# Patient Record
Sex: FEMALE | Race: WHITE | HISPANIC OR LATINO | Employment: UNEMPLOYED | URBAN - METROPOLITAN AREA
[De-identification: names, ages, dates, MRNs, and addresses within clinical notes are randomized per-mention and may not be internally consistent; named-entity substitution may affect disease eponyms.]

---

## 2023-04-28 PROBLEM — E66.812 CLASS 2 OBESITY DUE TO EXCESS CALORIES WITHOUT SERIOUS COMORBIDITY WITH BODY MASS INDEX (BMI) OF 36.0 TO 36.9 IN ADULT: Status: ACTIVE | Noted: 2023-04-28

## 2024-04-10 ENCOUNTER — TELEPHONE (OUTPATIENT)
Dept: OBSTETRICS AND GYNECOLOGY | Facility: CLINIC | Age: 29
End: 2024-04-10
Payer: MEDICAID

## 2024-04-10 DIAGNOSIS — E28.2 PCOS (POLYCYSTIC OVARIAN SYNDROME): Primary | ICD-10-CM

## 2024-04-10 DIAGNOSIS — N92.6 IRREGULAR MENSTRUAL BLEEDING: ICD-10-CM

## 2024-04-10 NOTE — TELEPHONE ENCOUNTER
----- Message from Dominique Garcia MA sent at 4/9/2024  1:11 PM EDT -----  Regarding: FW: Finish the pills   Contact: [phone]  Pt scheduled for labs for 4/22 if you would just place orders please and thank you  ----- Message -----  From: Nkechi Hamilton  Sent: 4/9/2024   1:05 PM EDT  To: [facility] Clinical [facility]  Subject: Finish the pills                                 Getachew thank you

## 2024-05-06 ENCOUNTER — TELEPHONE (OUTPATIENT)
Dept: OBSTETRICS AND GYNECOLOGY | Facility: CLINIC | Age: 29
End: 2024-05-06
Payer: MEDICAID

## 2024-05-29 ENCOUNTER — OFFICE VISIT (OUTPATIENT)
Dept: FAMILY MEDICINE CLINIC | Facility: CLINIC | Age: 29
End: 2024-05-29
Payer: MEDICAID

## 2024-05-29 VITALS
OXYGEN SATURATION: 97 % | WEIGHT: 199.8 LBS | RESPIRATION RATE: 18 BRPM | HEART RATE: 84 BPM | TEMPERATURE: 98 F | SYSTOLIC BLOOD PRESSURE: 120 MMHG | DIASTOLIC BLOOD PRESSURE: 80 MMHG | HEIGHT: 63 IN | BODY MASS INDEX: 35.4 KG/M2

## 2024-05-29 DIAGNOSIS — M54.42 ACUTE BILATERAL LOW BACK PAIN WITH BILATERAL SCIATICA: Primary | ICD-10-CM

## 2024-05-29 DIAGNOSIS — M54.41 ACUTE BILATERAL LOW BACK PAIN WITH BILATERAL SCIATICA: Primary | ICD-10-CM

## 2024-05-29 DIAGNOSIS — R20.2 PARESTHESIA OF LEFT UPPER LIMB: ICD-10-CM

## 2024-05-29 PROCEDURE — 1159F MED LIST DOCD IN RCRD: CPT

## 2024-05-29 PROCEDURE — 1160F RVW MEDS BY RX/DR IN RCRD: CPT

## 2024-05-29 PROCEDURE — 99214 OFFICE O/P EST MOD 30 MIN: CPT

## 2024-05-29 PROCEDURE — 1125F AMNT PAIN NOTED PAIN PRSNT: CPT

## 2024-05-29 RX ORDER — NAPROXEN 500 MG/1
500 TABLET ORAL 2 TIMES DAILY PRN
Qty: 30 TABLET | Refills: 0 | Status: SHIPPED | OUTPATIENT
Start: 2024-05-29

## 2024-05-29 RX ORDER — TRIAMCINOLONE ACETONIDE 1 MG/G
1 CREAM TOPICAL DAILY
COMMUNITY
Start: 2024-04-28

## 2024-05-29 NOTE — PROGRESS NOTES
"Chief Complaint  Back Pain (Injury on 4/25)    Subjective        Nkechi Saavedra presents to [facility]  History of Present Illness  Patient is a 28 y.o. female who presents to the office today for back pain. She is new to me but a patient of MIAH Wakefield.  She was last seen by her on 7/31/2023.  She states that she had a back injury on April 25 when she was carrying a heavy object.  She states she bent down and when she stood back up she had a sharp pain in her lower back.  She states she was seen at [facility] 2 weeks after and was diagnosed with acute bilateral low back pain with left-sided sciatica.  She states she had x-rays done at the time.  Records are not available, they have been requested.  She states her pain is improving but has not resolved.  She continues to take cyclobenzaprine as needed.  She complains of left and right leg tingling.  She denies incontinence of bowel or bladder.  She also complains of a pins and needle sensation down her left arm with numbness of her fingers.         Objective   Vital Signs:  /80 (BP Location: Left arm, Patient Position: Sitting, Cuff Size: Adult)   Pulse 84   Temp 98 °F (36.7 °C) (Temporal)   Resp 18   Ht 160 cm (62.99\")   Wt 90.6 kg (199 lb 12.8 oz)   SpO2 97%   BMI 35.40 kg/m²   Estimated body mass index is 35.4 kg/m² as calculated from the following:    Height as of this encounter: 160 cm (62.99\").    Weight as of this encounter: 90.6 kg (199 lb 12.8 oz).               Physical Exam  Constitutional:       Appearance: Normal appearance.   Cardiovascular:      Rate and Rhythm: Normal rate and regular rhythm.      Heart sounds: Normal heart sounds.   Pulmonary:      Effort: Pulmonary effort is normal.      Breath sounds: Normal breath sounds.   Musculoskeletal:      Cervical back: Normal.      Lumbar back: Tenderness present.   Neurological:      Mental Status: She is alert. "   Psychiatric:         Mood and Affect: Mood normal.        Result Review :    The following data was reviewed by: MIAH Dumont on 05/29/2024:  Common labs          7/8/2023    19:09 7/9/2023    11:25 7/11/2023    04:00   Common Labs   Calcium   9.8       Alkaline Phosphatase   56       WBC 8.86         Hemoglobin 12.9         Hematocrit 40.6         Platelets 363         Hemoglobin A1C  5.5           Details          This result is from an external source.                          Assessment and Plan     Diagnoses and all orders for this visit:    1. Acute bilateral low back pain with bilateral sciatica (Primary)  -     naproxen (NAPROSYN) 500 MG tablet; Take 1 tablet by mouth 2 (Two) Times a Day As Needed for Mild Pain.  Dispense: 30 tablet; Refill: 0  -     Ambulatory Referral to Physical Therapy    2. Paresthesia of left upper limb  -     XR Spine Cervical 2 or 3 View; Future    Patient agrees with plan of care and understands instructions. Call if worsening symptoms or any problems or concerns.            Follow Up     Return if symptoms worsen or fail to improve.  Patient was given instructions and counseling regarding her condition or for health maintenance advice. Please see specific information pulled into the AVS if appropriate.

## 2024-05-30 ENCOUNTER — PATIENT MESSAGE (OUTPATIENT)
Dept: OBSTETRICS AND GYNECOLOGY | Facility: CLINIC | Age: 29
End: 2024-05-30
Payer: MEDICAID

## 2024-05-30 NOTE — TELEPHONE ENCOUNTER
HCG quant and progesterone done 4/22/24. States she went a few days ago to do blood test, but not seeing results of progesterone or HCG. I did not see a new order. Thank you

## 2024-05-31 ENCOUNTER — TELEPHONE (OUTPATIENT)
Dept: OBSTETRICS AND GYNECOLOGY | Facility: CLINIC | Age: 29
End: 2024-05-31
Payer: MEDICAID

## 2024-05-31 NOTE — TELEPHONE ENCOUNTER
OK. Please let pt know that labs were not done.  Due to labs having to be done in a very specific day of the cycle, we cannot repeat them now.  She needs to contact us with the start of her next menstrual cycle, or if she does not have a menstrual cycle after another 2 weeks

## 2024-07-31 ENCOUNTER — OFFICE VISIT (OUTPATIENT)
Dept: FAMILY MEDICINE CLINIC | Facility: CLINIC | Age: 29
End: 2024-07-31
Payer: MEDICAID

## 2024-07-31 VITALS
OXYGEN SATURATION: 97 % | SYSTOLIC BLOOD PRESSURE: 130 MMHG | HEIGHT: 63 IN | DIASTOLIC BLOOD PRESSURE: 90 MMHG | WEIGHT: 204 LBS | TEMPERATURE: 98 F | HEART RATE: 84 BPM | BODY MASS INDEX: 36.14 KG/M2

## 2024-07-31 DIAGNOSIS — R73.09 ELEVATED GLUCOSE LEVEL: ICD-10-CM

## 2024-07-31 DIAGNOSIS — H57.11 EYE DISCOMFORT, RIGHT: Primary | ICD-10-CM

## 2024-07-31 DIAGNOSIS — E55.9 VITAMIN D DEFICIENCY: ICD-10-CM

## 2024-07-31 DIAGNOSIS — E66.09 CLASS 2 OBESITY DUE TO EXCESS CALORIES WITHOUT SERIOUS COMORBIDITY WITH BODY MASS INDEX (BMI) OF 36.0 TO 36.9 IN ADULT: ICD-10-CM

## 2024-07-31 DIAGNOSIS — E78.2 MIXED HYPERLIPIDEMIA: ICD-10-CM

## 2024-07-31 PROBLEM — E04.1 THYROID NODULE: Status: RESOLVED | Noted: 2023-02-22 | Resolved: 2024-07-31

## 2024-07-31 LAB
25(OH)D3+25(OH)D2 SERPL-MCNC: 23.9 NG/ML (ref 30–100)
ALBUMIN SERPL-MCNC: 4.6 G/DL (ref 3.5–5.2)
ALBUMIN/GLOB SERPL: 1.8 G/DL
ALP SERPL-CCNC: 66 U/L (ref 39–117)
ALT SERPL-CCNC: 39 U/L (ref 1–33)
AST SERPL-CCNC: 21 U/L (ref 1–32)
BASOPHILS # BLD AUTO: 0.05 10*3/MM3 (ref 0–0.2)
BASOPHILS NFR BLD AUTO: 0.5 % (ref 0–1.5)
BILIRUB SERPL-MCNC: 0.2 MG/DL (ref 0–1.2)
BUN SERPL-MCNC: 12 MG/DL (ref 6–20)
BUN/CREAT SERPL: 16.2 (ref 7–25)
CALCIUM SERPL-MCNC: 9.4 MG/DL (ref 8.6–10.5)
CHLORIDE SERPL-SCNC: 104 MMOL/L (ref 98–107)
CHOLEST SERPL-MCNC: 220 MG/DL (ref 0–200)
CO2 SERPL-SCNC: 24.9 MMOL/L (ref 22–29)
CREAT SERPL-MCNC: 0.74 MG/DL (ref 0.57–1)
EGFRCR SERPLBLD CKD-EPI 2021: 114.6 ML/MIN/1.73
EOSINOPHIL # BLD AUTO: 0.56 10*3/MM3 (ref 0–0.4)
EOSINOPHIL NFR BLD AUTO: 5.1 % (ref 0.3–6.2)
ERYTHROCYTE [DISTWIDTH] IN BLOOD BY AUTOMATED COUNT: 13 % (ref 12.3–15.4)
GLOBULIN SER CALC-MCNC: 2.6 GM/DL
GLUCOSE SERPL-MCNC: 100 MG/DL (ref 65–99)
HBA1C MFR BLD: 5.4 % (ref 4.8–5.6)
HCT VFR BLD AUTO: 42.2 % (ref 34–46.6)
HDLC SERPL-MCNC: 57 MG/DL (ref 40–60)
HGB BLD-MCNC: 13.3 G/DL (ref 12–15.9)
IMM GRANULOCYTES # BLD AUTO: 0.07 10*3/MM3 (ref 0–0.05)
IMM GRANULOCYTES NFR BLD AUTO: 0.6 % (ref 0–0.5)
LDLC SERPL CALC-MCNC: 141 MG/DL (ref 0–100)
LYMPHOCYTES # BLD AUTO: 3.85 10*3/MM3 (ref 0.7–3.1)
LYMPHOCYTES NFR BLD AUTO: 34.9 % (ref 19.6–45.3)
MCH RBC QN AUTO: 25.4 PG (ref 26.6–33)
MCHC RBC AUTO-ENTMCNC: 31.5 G/DL (ref 31.5–35.7)
MCV RBC AUTO: 80.5 FL (ref 79–97)
MONOCYTES # BLD AUTO: 0.67 10*3/MM3 (ref 0.1–0.9)
MONOCYTES NFR BLD AUTO: 6.1 % (ref 5–12)
NEUTROPHILS # BLD AUTO: 5.82 10*3/MM3 (ref 1.7–7)
NEUTROPHILS NFR BLD AUTO: 52.8 % (ref 42.7–76)
NRBC BLD AUTO-RTO: 0 /100 WBC (ref 0–0.2)
PLATELET # BLD AUTO: 339 10*3/MM3 (ref 140–450)
POTASSIUM SERPL-SCNC: 4.5 MMOL/L (ref 3.5–5.2)
PROT SERPL-MCNC: 7.2 G/DL (ref 6–8.5)
RBC # BLD AUTO: 5.24 10*6/MM3 (ref 3.77–5.28)
SODIUM SERPL-SCNC: 140 MMOL/L (ref 136–145)
TRIGL SERPL-MCNC: 124 MG/DL (ref 0–150)
TSH SERPL DL<=0.005 MIU/L-ACNC: 1.19 UIU/ML (ref 0.27–4.2)
VLDLC SERPL CALC-MCNC: 22 MG/DL (ref 5–40)
WBC # BLD AUTO: 11.02 10*3/MM3 (ref 3.4–10.8)

## 2024-07-31 PROCEDURE — 1126F AMNT PAIN NOTED NONE PRSNT: CPT

## 2024-07-31 PROCEDURE — 1160F RVW MEDS BY RX/DR IN RCRD: CPT

## 2024-07-31 PROCEDURE — 99214 OFFICE O/P EST MOD 30 MIN: CPT

## 2024-07-31 PROCEDURE — 1159F MED LIST DOCD IN RCRD: CPT

## 2024-07-31 NOTE — PROGRESS NOTES
"Chief Complaint  Lab work (Fasting. ) and Eye Problem (Right eye feels irritated/)    Subjective        Nkechi Saavedra presents to [facility]  History of Present Illness  Patient is a 28 y.o. female who presents to the office today for right eye irritation.  She states that she was doing her make-up 2 days ago and the applicator fell and hit her right eye.  She states at that time she did have some blurred vision which has not resolved.  She denies pain but states that there is a discomfort and she feels like there is something in her eye.    She is followed by Dr. Lucho Mccullough, endocrinology, for obesity and PCOS.     She is requesting to have labs drawn today     She is fasting today.   Eye Problem   The right eye is affected. This is a new problem. The current episode started in the past 7 days (2 days ago). The problem has been unchanged. The injury mechanism was a direct trauma. The pain is at a severity of 0/10. The patient is experiencing no pain. There is No known exposure to pink eye. She Does not wear contacts. Associated symptoms include a foreign body sensation. Pertinent negatives include no blurred vision, eye discharge, double vision, fever or itching. She has tried nothing for the symptoms.       Objective   Vital Signs:  /90   Pulse 84   Temp 98 °F (36.7 °C) (Temporal)   Ht 160 cm (62.99\")   Wt 92.5 kg (204 lb)   SpO2 97%   BMI 36.15 kg/m²   Estimated body mass index is 36.15 kg/m² as calculated from the following:    Height as of this encounter: 160 cm (62.99\").    Weight as of this encounter: 92.5 kg (204 lb).             Physical Exam  Constitutional:       Appearance: Normal appearance.   Eyes:      General: Lids are normal. Vision grossly intact.         Right eye: No foreign body, discharge or hordeolum.         Left eye: No foreign body, discharge or hordeolum.      Extraocular Movements:      Right eye: Normal extraocular motion.      " Left eye: Normal extraocular motion.      Conjunctiva/sclera:      Right eye: Right conjunctiva is not injected. No chemosis, exudate or hemorrhage.  Cardiovascular:      Rate and Rhythm: Normal rate and regular rhythm.      Heart sounds: Normal heart sounds.   Pulmonary:      Effort: Pulmonary effort is normal.      Breath sounds: Normal breath sounds.   Neurological:      Mental Status: She is alert.   Psychiatric:         Mood and Affect: Mood normal.        Result Review :    The following data was reviewed by: MIAH Dumont on 07/31/2024:                 Assessment and Plan     Diagnoses and all orders for this visit:    1. Eye discomfort, right (Primary)  Assessment & Plan:  Instructed her to be evaluated by ophthalmologist.  Provided her with a list of nearby practices and instructed her to call for an appointment.      2. Elevated glucose level  -     Hemoglobin A1c    3. Mixed hyperlipidemia  Assessment & Plan:  Avoid fried fatty foods, eat a healthy well-balanced diet with increased fiber intake, and engage in frequent aerobic exercise.      Orders:  -     CBC & Differential  -     Comprehensive Metabolic Panel  -     Lipid Panel    4. Vitamin D deficiency  -     Vitamin D,25-Hydroxy    5. Class 2 obesity due to excess calories without serious comorbidity with body mass index (BMI) of 36.0 to 36.9 in adult  Assessment & Plan:  Patient's (Body mass index is 36.15 kg/m².) indicates that they are obese (BMI >30) with health conditions that include none . Weight is unchanged. BMI  is above average; BMI management plan is completed. We discussed portion control and increasing exercise.     Orders:  -     CBC & Differential  -     Comprehensive Metabolic Panel  -     Lipid Panel  -     TSH Rfx On Abnormal To Free T4    Patient agrees with plan of care and understands instructions. Call if worsening symptoms or any problems or concerns.            Follow Up     Return in about 3 months (around 10/31/2024)  for Annual physical.  Patient was given instructions and counseling regarding her condition or for health maintenance advice. Please see specific information pulled into the AVS if appropriate.

## 2024-07-31 NOTE — ASSESSMENT & PLAN NOTE
Instructed her to be evaluated by ophthalmologist.  Provided her with a list of nearby practices and instructed her to call for an appointment.

## 2024-07-31 NOTE — ASSESSMENT & PLAN NOTE
Avoid fried fatty foods, eat a healthy well-balanced diet with increased fiber intake, and engage in frequent aerobic exercise.

## 2024-07-31 NOTE — ASSESSMENT & PLAN NOTE
Patient's (Body mass index is 36.15 kg/m².) indicates that they are obese (BMI >30) with health conditions that include none . Weight is unchanged. BMI  is above average; BMI management plan is completed. We discussed portion control and increasing exercise.    21-May-2024 18:51

## 2024-08-01 DIAGNOSIS — R79.89 LOW TSH LEVEL: ICD-10-CM

## 2024-08-01 DIAGNOSIS — E55.9 VITAMIN D DEFICIENCY: ICD-10-CM

## 2024-08-01 RX ORDER — ERGOCALCIFEROL 1.25 MG/1
50000 CAPSULE ORAL WEEKLY
Qty: 12 CAPSULE | Refills: 0 | Status: SHIPPED | OUTPATIENT
Start: 2024-08-01

## 2024-08-06 ENCOUNTER — OFFICE VISIT (OUTPATIENT)
Dept: OBSTETRICS AND GYNECOLOGY | Facility: CLINIC | Age: 29
End: 2024-08-06
Payer: MEDICAID

## 2024-08-06 VITALS
DIASTOLIC BLOOD PRESSURE: 82 MMHG | HEIGHT: 63 IN | SYSTOLIC BLOOD PRESSURE: 122 MMHG | WEIGHT: 206.2 LBS | BODY MASS INDEX: 36.54 KG/M2

## 2024-08-06 DIAGNOSIS — N97.9 INFERTILITY, FEMALE: ICD-10-CM

## 2024-08-06 DIAGNOSIS — R10.32 LLQ ABDOMINAL PAIN: ICD-10-CM

## 2024-08-06 DIAGNOSIS — E28.2 PCOS (POLYCYSTIC OVARIAN SYNDROME): Primary | ICD-10-CM

## 2024-08-06 PROCEDURE — 99213 OFFICE O/P EST LOW 20 MIN: CPT | Performed by: OBSTETRICS & GYNECOLOGY

## 2024-08-06 NOTE — PROGRESS NOTES
"Chief Complaint  Gynecologic Exam (Fertility follow up)    Subjective        Nkechi Saavedra presents to [facility]  History of Present Illness  Patient here to discuss fertility.  She last took letrozole and Clomid in April 2024.  She has been having regular monthly menstrual cycle since then.  Patient states that she think she would like to hold off on resuming Clomid and letrozole for now.  She has been seeing an endocrinologist and is trying to work on weight loss.    She is also having some concerns about some intermittent left lower quadrant abdominal pain.  She says she feels it \"in the area of her ovary\"    Her  has never had a semen analysis.  She has never had hysterosalpingogram.    The following portions of the patient's history were reviewed and updated as appropriate: allergies, current medications, past family history, past medical history, past social history, past surgical history, and problem list.    Objective   Vital Signs:  /82   Ht 160 cm (62.99\")   Wt 93.5 kg (206 lb 3.2 oz)   BMI 36.54 kg/m²   Estimated body mass index is 36.54 kg/m² as calculated from the following:    Height as of this encounter: 160 cm (62.99\").    Weight as of this encounter: 93.5 kg (206 lb 3.2 oz).               Physical Exam   General: No acute distress, awake and oriented x3  Psychiatric: good judgment and insight, normal mood  Neurological: cranial nerves II through XII intact, no deficits    Result Review :            Office Visit on 07/31/2024   Component Date Value Ref Range Status    Hemoglobin A1C 07/31/2024 5.40  4.80 - 5.60 % Final    Comment: Hemoglobin A1C Ranges:  Increased Risk for Diabetes  5.7% to 6.4%  Diabetes                     >= 6.5%  Diabetic Goal                < 7.0%      WBC 07/31/2024 11.02 (H)  3.40 - 10.80 10*3/mm3 Final    RBC 07/31/2024 5.24  3.77 - 5.28 10*6/mm3 Final    Hemoglobin 07/31/2024 13.3  12.0 - 15.9 g/dL Final    Hematocrit " 07/31/2024 42.2  34.0 - 46.6 % Final    MCV 07/31/2024 80.5  79.0 - 97.0 fL Final    MCH 07/31/2024 25.4 (L)  26.6 - 33.0 pg Final    MCHC 07/31/2024 31.5  31.5 - 35.7 g/dL Final    RDW 07/31/2024 13.0  12.3 - 15.4 % Final    Platelets 07/31/2024 339  140 - 450 10*3/mm3 Final    Neutrophil Rel % 07/31/2024 52.8  42.7 - 76.0 % Final    Lymphocyte Rel % 07/31/2024 34.9  19.6 - 45.3 % Final    Monocyte Rel % 07/31/2024 6.1  5.0 - 12.0 % Final    Eosinophil Rel % 07/31/2024 5.1  0.3 - 6.2 % Final    Basophil Rel % 07/31/2024 0.5  0.0 - 1.5 % Final    Neutrophils Absolute 07/31/2024 5.82  1.70 - 7.00 10*3/mm3 Final    Lymphocytes Absolute 07/31/2024 3.85 (H)  0.70 - 3.10 10*3/mm3 Final    Monocytes Absolute 07/31/2024 0.67  0.10 - 0.90 10*3/mm3 Final    Eosinophils Absolute 07/31/2024 0.56 (H)  0.00 - 0.40 10*3/mm3 Final    Basophils Absolute 07/31/2024 0.05  0.00 - 0.20 10*3/mm3 Final    Immature Granulocyte Rel % 07/31/2024 0.6 (H)  0.0 - 0.5 % Final    Immature Grans Absolute 07/31/2024 0.07 (H)  0.00 - 0.05 10*3/mm3 Final    nRBC 07/31/2024 0.0  0.0 - 0.2 /100 WBC Final    Glucose 07/31/2024 100 (H)  65 - 99 mg/dL Final    BUN 07/31/2024 12  6 - 20 mg/dL Final    Creatinine 07/31/2024 0.74  0.57 - 1.00 mg/dL Final    EGFR Result 07/31/2024 114.6  >60.0 mL/min/1.73 Final    Comment: GFR Normal >60  Chronic Kidney Disease <60  Kidney Failure <15      BUN/Creatinine Ratio 07/31/2024 16.2  7.0 - 25.0 Final    Sodium 07/31/2024 140  136 - 145 mmol/L Final    Potassium 07/31/2024 4.5  3.5 - 5.2 mmol/L Final    Chloride 07/31/2024 104  98 - 107 mmol/L Final    Total CO2 07/31/2024 24.9  22.0 - 29.0 mmol/L Final    Calcium 07/31/2024 9.4  8.6 - 10.5 mg/dL Final    Total Protein 07/31/2024 7.2  6.0 - 8.5 g/dL Final    Albumin 07/31/2024 4.6  3.5 - 5.2 g/dL Final    Globulin 07/31/2024 2.6  gm/dL Final    A/G Ratio 07/31/2024 1.8  g/dL Final    Total Bilirubin 07/31/2024 0.2  0.0 - 1.2 mg/dL Final    Alkaline Phosphatase  07/31/2024 66  39 - 117 U/L Final    AST (SGOT) 07/31/2024 21  1 - 32 U/L Final    ALT (SGPT) 07/31/2024 39 (H)  1 - 33 U/L Final    Total Cholesterol 07/31/2024 220 (H)  0 - 200 mg/dL Final    Comment: Cholesterol Reference Ranges  (U.S. Department of Health and Human Services ATP III  Classifications)  Desirable          <200 mg/dL  Borderline High    200-239 mg/dL  High Risk          >240 mg/dL  Triglyceride Reference Ranges  (U.S. Department of Health and Human Services ATP III  Classifications)  Normal           <150 mg/dL  Borderline High  150-199 mg/dL  High             200-499 mg/dL  Very High        >500 mg/dL  HDL Reference Ranges  (U.S. Department of Health and Human Services ATP III  Classifications)  Low     <40 mg/dl (major risk factor for CHD)  High    >60 mg/dl ('negative' risk factor for CHD)  LDL Reference Ranges  (U.S. Department of Health and Human Services ATP III  Classifications)  Optimal          <100 mg/dL  Near Optimal     100-129 mg/dL  Borderline High  130-159 mg/dL  High             160-189 mg/dL  Very High        >189 mg/dL      Triglycerides 07/31/2024 124  0 - 150 mg/dL Final    HDL Cholesterol 07/31/2024 57  40 - 60 mg/dL Final    VLDL Cholesterol Long 07/31/2024 22  5 - 40 mg/dL Final    LDL Chol Calc (NIH) 07/31/2024 141 (H)  0 - 100 mg/dL Final    25 Hydroxy, Vitamin D 07/31/2024 23.9 (L)  30.0 - 100.0 ng/ml Final    Comment: Reference Range for Total Vitamin D 25(OH)  Deficiency <20.0 ng/mL  Insufficiency 21-29 ng/mL  Sufficiency  ng/mL  Toxicity >100 ng/ml      TSH 07/31/2024 1.190  0.270 - 4.200 uIU/mL Final                Assessment and Plan     Diagnoses and all orders for this visit:    1. PCOS (polycystic ovarian syndrome) (Primary)    2. Infertility, female    3. LLQ abdominal pain  -     US Non-ob Transvaginal; Future    Can obtain ultrasound to evaluate for potential causes of left lower quadrant abdominal pain.    We discussed patient's wishes for fertility  management.  She wants to hold off on ovulation induction medication at this time.  She says she wants to try to work on weight loss and her overall health.  Patient has been having regular menstrual cycles recently, so may be ovulatory spontaneously.  We discussed the remainder of the workup that we do for infertility.  Patient would like to have her  do a semen analysis.  We also discussed hysterosalpingogram.  She would like to hold off on HSG for now as she has no risk factors.         Follow Up     Return GYN US and GYn FU next avail, for Semen analysis referral.  Patient was given instructions and counseling regarding her condition or for health maintenance advice. Please see specific information pulled into the AVS if appropriate.

## 2024-08-07 DIAGNOSIS — G43.019 INTRACTABLE MIGRAINE WITHOUT AURA AND WITHOUT STATUS MIGRAINOSUS: ICD-10-CM

## 2024-08-07 RX ORDER — SUMATRIPTAN 100 MG/1
TABLET, FILM COATED ORAL
Qty: 6 TABLET | Refills: 5 | Status: SHIPPED | OUTPATIENT
Start: 2024-08-07

## 2024-09-20 ENCOUNTER — TELEPHONE (OUTPATIENT)
Dept: OBSTETRICS AND GYNECOLOGY | Facility: CLINIC | Age: 29
End: 2024-09-20

## 2024-09-20 NOTE — TELEPHONE ENCOUNTER
Provider: DR. CONTRERAS    Caller: IRINA JOHNSON    Relationship to Patient:     Pharmacy:     Phone Number: [phone]    Reason for Call: RESCHEDULE APPOINTMENT FROM 09.27.24    When was the patient last seen: 08.06.24    PATIENT IS TRAVELING AND WILL NOT BE ABLE TO MAKE APPOINTMENT ON 09.27.24. PATIENT WOULD LIKE TO BE RESCHEDULE FOR APPOINTMENT AND US FOR 10/21/24 OR 10/22/24 IF POSSIBLE.    PLEASE REACH OUT TO PATIENT  WHEN RESCHEDULING AT  [phone]      THANK YOU

## 2024-10-22 ENCOUNTER — OFFICE VISIT (OUTPATIENT)
Dept: OBSTETRICS AND GYNECOLOGY | Facility: CLINIC | Age: 29
End: 2024-10-22
Payer: MEDICAID

## 2024-10-22 VITALS
SYSTOLIC BLOOD PRESSURE: 129 MMHG | DIASTOLIC BLOOD PRESSURE: 84 MMHG | HEART RATE: 85 BPM | WEIGHT: 209 LBS | BODY MASS INDEX: 37.03 KG/M2 | HEIGHT: 63 IN

## 2024-10-22 DIAGNOSIS — E28.2 PCOS (POLYCYSTIC OVARIAN SYNDROME): ICD-10-CM

## 2024-10-22 DIAGNOSIS — N91.2 ABSENT MENSES: Primary | ICD-10-CM

## 2024-10-22 NOTE — PROGRESS NOTES
"Chief Complaint  Follow-up (Pt is here to f/u on US and LLQ pain. Pt's ultrasound irritated her abdomen slightly, but the pain has gotten a little better overall. Pt states that she cramps about once a week.)    Entirety of today's encounter including patient interview, exam, and counseling performed with the aid of a medical  via the telephone.     Subjective        Nkechi Saavedra presents to [facility]  History of Present Illness  Patient is here for follow-up of PCOS, lab results, ultrasound results.  Patient reports she still has not had a menstrual cycle since her last period in July 2024.   Patient had previously been undergoing ovulation induction with medication including letrozole and clomiphene; however, had had some months of failure of this, but then also had some months of success.  She had elected at the time of the last visit to stop use of the Clomid and letrozole and wanted to try a more natural approach.  She wanted to try for weight loss.  She has started seeing an endocrinologist.  She has been traveling over the past few months between here in [place], if she has actually not been able to see the endocrinologist or take the medication prescribed.  She has actually gained about 10 pounds over the span of the last 5 months.    Patient reports issues with trying to take Provera and metformin in the past.  She states she had been prescribed metformin and Provera at the same time and reports that she did not like the way that made her feel.    The following portions of the patient's history were reviewed and updated as appropriate: allergies, current medications, past family history, past medical history, past social history, past surgical history, and problem list.    Objective   Vital Signs:  /84   Pulse 85   Ht 160 cm (63\")   Wt 94.8 kg (209 lb)   BMI 37.02 kg/m²   Estimated body mass index is 37.02 kg/m² as calculated from the " "following:    Height as of this encounter: 160 cm (63\").    Weight as of this encounter: 94.8 kg (209 lb).            Physical Exam   General: No acute distress, awake and oriented x3  Psychiatric: good judgment and insight, normal mood  Neurological: cranial nerves II through XII intact, no deficits    Result Review :          Ultrasound today:  Findings:  Uterus measures 6.78 x 4.12 x 3.05 cm, volume 44.609 cm cube  There are no intramural or subserosal fibroids or masses identified.  Endometrial thickness measures 0.50 cm, and is homogenous appearance without evidence of polyps.  Cervix is normal-appearing.  There benign-appearing nabothian cyst noted.     Left ovary: 3.55 x 2.56 x 2.20 cm, volume 10.469 cm cube  There is no adnexal mass or cyst identified.     Right ovary: 4.31 x 2.19 x 2.80 cm, volume 13.838 cm cube  There is no adnexal mass or cyst identified.     Both ovaries have polycystic appearance     There is no free fluid.  Impression:     Ovaries appear to have classic appearance of polycystic ovarian syndrome.  Otherwise normal pelvic ultrasound without pathologic appearing masses noted.     Assessment and Plan   Diagnoses and all orders for this visit:    1. Absent menses (Primary)    2. PCOS (polycystic ovarian syndrome)      Discussed the use of either cyclic Provera or hormonal contraception to try to induce withdrawal bleed and prevent unopposed estrogen effects from PCOS.  We discussed both the short-term and long-term risk of untreated PCOS including short-term risks of irregular menses, excessive bleeding, potential for significant anemia and complications thereof, and possible need of blood transfusion.  Long-term risks include unopposed estrogen effect on the endometrial lining leading to polyp formation, endometrial hyperplasia, and even endometrial cancer.    Patient declines hormonal therapy at this time including either hormonal contraception or cyclical progesterone use.  She also " declines use of metformin.  Prefers to try to lose weight and expectant management.  She should continue to follow-up with endocrinology for help with weight loss.  I explained that she can work on these approaches for short time; however, it is not a good idea to continue to have prolonged amenorrhea if she is not successfully losing weight.  Would not recommend trying this for more than another 9 months without any significant change.  She verbalized understanding  She can return to see me in 9 months.    I spent 30 minutes today on the care of this patient, including review of the chart and any pertinent prior imaging and labs, interview/exam of the patient, developing care plan, and counseling the patient on management options and excluding any time spent on other separate billable services such as performing procedures or test interpretation, if applicable.           Follow Up   No follow-ups on file.  Patient was given instructions and counseling regarding her condition or for health maintenance advice. Please see specific information pulled into the AVS if appropriate.

## 2024-10-23 ENCOUNTER — PATIENT MESSAGE (OUTPATIENT)
Dept: OBSTETRICS AND GYNECOLOGY | Facility: CLINIC | Age: 29
End: 2024-10-23
Payer: MEDICAID

## 2024-11-18 DIAGNOSIS — G43.019 INTRACTABLE MIGRAINE WITHOUT AURA AND WITHOUT STATUS MIGRAINOSUS: ICD-10-CM

## 2024-11-18 DIAGNOSIS — E55.9 VITAMIN D DEFICIENCY: ICD-10-CM

## 2024-11-18 RX ORDER — SUMATRIPTAN 100 MG/1
TABLET, FILM COATED ORAL
Qty: 6 TABLET | Refills: 5 | Status: SHIPPED | OUTPATIENT
Start: 2024-11-18

## 2024-11-18 RX ORDER — ERGOCALCIFEROL 1.25 MG/1
50000 CAPSULE, LIQUID FILLED ORAL WEEKLY
Qty: 12 CAPSULE | Refills: 0 | Status: SHIPPED | OUTPATIENT
Start: 2024-11-18

## 2024-12-13 ENCOUNTER — HOSPITAL ENCOUNTER (INPATIENT)
Facility: HOSPITAL | Age: 29
LOS: 2 days | Discharge: HOME | DRG: 872 | End: 2024-12-16
Attending: EMERGENCY MEDICINE | Admitting: STUDENT IN AN ORGANIZED HEALTH CARE EDUCATION/TRAINING PROGRAM
Payer: MEDICAID

## 2024-12-13 ENCOUNTER — APPOINTMENT (OUTPATIENT)
Dept: CT IMAGING | Facility: HOSPITAL | Age: 29
DRG: 872 | End: 2024-12-13
Payer: MEDICAID

## 2024-12-13 DIAGNOSIS — N12 PYELONEPHRITIS OF RIGHT KIDNEY: Primary | ICD-10-CM

## 2024-12-13 PROBLEM — R10.9 ABDOMINAL PAIN: Status: ACTIVE | Noted: 2024-12-13

## 2024-12-13 LAB
ALBUMIN SERPL-MCNC: 4.6 G/DL (ref 3.5–5.2)
ALBUMIN/GLOB SERPL: 1.1 G/DL
ALP SERPL-CCNC: 88 U/L (ref 39–117)
ALT SERPL W P-5'-P-CCNC: 40 U/L (ref 1–33)
ANION GAP SERPL CALCULATED.3IONS-SCNC: 12.1 MMOL/L (ref 5–15)
AST SERPL-CCNC: 26 U/L (ref 1–32)
BACTERIA UR QL AUTO: ABNORMAL /HPF
BASOPHILS # BLD AUTO: 0.04 10*3/MM3 (ref 0–0.2)
BASOPHILS NFR BLD AUTO: 0.3 % (ref 0–1.5)
BILIRUB SERPL-MCNC: 0.5 MG/DL (ref 0–1.2)
BILIRUB UR QL STRIP: NEGATIVE
BUN SERPL-MCNC: 7 MG/DL (ref 6–20)
BUN/CREAT SERPL: 9.2 (ref 7–25)
CALCIUM SPEC-SCNC: 9.6 MG/DL (ref 8.6–10.5)
CHLORIDE SERPL-SCNC: 100 MMOL/L (ref 98–107)
CLARITY UR: ABNORMAL
CO2 SERPL-SCNC: 22.9 MMOL/L (ref 22–29)
COLOR UR: YELLOW
CREAT SERPL-MCNC: 0.76 MG/DL (ref 0.57–1)
D-LACTATE SERPL-SCNC: 1.9 MMOL/L (ref 0.5–2)
DEPRECATED RDW RBC AUTO: 36.9 FL (ref 37–54)
EGFRCR SERPLBLD CKD-EPI 2021: 110.3 ML/MIN/1.73
EOSINOPHIL # BLD AUTO: 0.11 10*3/MM3 (ref 0–0.4)
EOSINOPHIL NFR BLD AUTO: 1 % (ref 0.3–6.2)
ERYTHROCYTE [DISTWIDTH] IN BLOOD BY AUTOMATED COUNT: 13.5 % (ref 12.3–15.4)
GLOBULIN UR ELPH-MCNC: 4.1 GM/DL
GLUCOSE SERPL-MCNC: 92 MG/DL (ref 65–99)
GLUCOSE UR STRIP-MCNC: NEGATIVE MG/DL
HCG SERPL QL: NEGATIVE
HCT VFR BLD AUTO: 43.9 % (ref 34–46.6)
HGB BLD-MCNC: 14.3 G/DL (ref 12–15.9)
HGB UR QL STRIP.AUTO: ABNORMAL
HOLD SPECIMEN: NORMAL
HOLD SPECIMEN: NORMAL
HYALINE CASTS UR QL AUTO: ABNORMAL /LPF
IMM GRANULOCYTES # BLD AUTO: 0.04 10*3/MM3 (ref 0–0.05)
IMM GRANULOCYTES NFR BLD AUTO: 0.3 % (ref 0–0.5)
KETONES UR QL STRIP: NEGATIVE
LEUKOCYTE ESTERASE UR QL STRIP.AUTO: ABNORMAL
LYMPHOCYTES # BLD AUTO: 1.75 10*3/MM3 (ref 0.7–3.1)
LYMPHOCYTES NFR BLD AUTO: 15.2 % (ref 19.6–45.3)
MCH RBC QN AUTO: 25 PG (ref 26.6–33)
MCHC RBC AUTO-ENTMCNC: 32.6 G/DL (ref 31.5–35.7)
MCV RBC AUTO: 76.9 FL (ref 79–97)
MONOCYTES # BLD AUTO: 0.25 10*3/MM3 (ref 0.1–0.9)
MONOCYTES NFR BLD AUTO: 2.2 % (ref 5–12)
NEUTROPHILS NFR BLD AUTO: 81 % (ref 42.7–76)
NEUTROPHILS NFR BLD AUTO: 9.36 10*3/MM3 (ref 1.7–7)
NITRITE UR QL STRIP: POSITIVE
NRBC BLD AUTO-RTO: 0 /100 WBC (ref 0–0.2)
PH UR STRIP.AUTO: 5.5 [PH] (ref 5–8)
PLATELET # BLD AUTO: 334 10*3/MM3 (ref 140–450)
PMV BLD AUTO: 9.2 FL (ref 6–12)
POTASSIUM SERPL-SCNC: 4 MMOL/L (ref 3.5–5.2)
PROT SERPL-MCNC: 8.7 G/DL (ref 6–8.5)
PROT UR QL STRIP: ABNORMAL
RBC # BLD AUTO: 5.71 10*6/MM3 (ref 3.77–5.28)
RBC # UR STRIP: ABNORMAL /HPF
REF LAB TEST METHOD: ABNORMAL
SODIUM SERPL-SCNC: 135 MMOL/L (ref 136–145)
SP GR UR STRIP: 1.01 (ref 1–1.03)
SQUAMOUS #/AREA URNS HPF: ABNORMAL /HPF
UROBILINOGEN UR QL STRIP: ABNORMAL
WBC # UR STRIP: ABNORMAL /HPF
WBC CLUMPS # UR AUTO: PRESENT /HPF
WBC NRBC COR # BLD AUTO: 11.55 10*3/MM3 (ref 3.4–10.8)
WHOLE BLOOD HOLD COAG: NORMAL
WHOLE BLOOD HOLD SPECIMEN: NORMAL

## 2024-12-13 PROCEDURE — 87086 URINE CULTURE/COLONY COUNT: CPT | Performed by: EMERGENCY MEDICINE

## 2024-12-13 PROCEDURE — 25810000003 LACTATED RINGERS SOLUTION: Performed by: EMERGENCY MEDICINE

## 2024-12-13 PROCEDURE — 36415 COLL VENOUS BLD VENIPUNCTURE: CPT

## 2024-12-13 PROCEDURE — 99285 EMERGENCY DEPT VISIT HI MDM: CPT

## 2024-12-13 PROCEDURE — 85025 COMPLETE CBC W/AUTO DIFF WBC: CPT

## 2024-12-13 PROCEDURE — 87040 BLOOD CULTURE FOR BACTERIA: CPT | Performed by: EMERGENCY MEDICINE

## 2024-12-13 PROCEDURE — 25010000002 ONDANSETRON PER 1 MG: Performed by: EMERGENCY MEDICINE

## 2024-12-13 PROCEDURE — 80053 COMPREHEN METABOLIC PANEL: CPT | Performed by: EMERGENCY MEDICINE

## 2024-12-13 PROCEDURE — 84703 CHORIONIC GONADOTROPIN ASSAY: CPT | Performed by: EMERGENCY MEDICINE

## 2024-12-13 PROCEDURE — 87154 CUL TYP ID BLD PTHGN 6+ TRGT: CPT | Performed by: EMERGENCY MEDICINE

## 2024-12-13 PROCEDURE — 81001 URINALYSIS AUTO W/SCOPE: CPT | Performed by: EMERGENCY MEDICINE

## 2024-12-13 PROCEDURE — 25010000002 HYDROMORPHONE PER 4 MG: Performed by: EMERGENCY MEDICINE

## 2024-12-13 PROCEDURE — 87040 BLOOD CULTURE FOR BACTERIA: CPT

## 2024-12-13 PROCEDURE — G0378 HOSPITAL OBSERVATION PER HR: HCPCS

## 2024-12-13 PROCEDURE — 87186 SC STD MICRODIL/AGAR DIL: CPT | Performed by: EMERGENCY MEDICINE

## 2024-12-13 PROCEDURE — 74176 CT ABD & PELVIS W/O CONTRAST: CPT

## 2024-12-13 PROCEDURE — 87088 URINE BACTERIA CULTURE: CPT | Performed by: EMERGENCY MEDICINE

## 2024-12-13 PROCEDURE — 25010000002 CEFTRIAXONE PER 250 MG: Performed by: EMERGENCY MEDICINE

## 2024-12-13 PROCEDURE — 83605 ASSAY OF LACTIC ACID: CPT | Performed by: EMERGENCY MEDICINE

## 2024-12-13 RX ORDER — HYDROMORPHONE HYDROCHLORIDE 1 MG/ML
0.5 INJECTION, SOLUTION INTRAMUSCULAR; INTRAVENOUS; SUBCUTANEOUS ONCE
Status: COMPLETED | OUTPATIENT
Start: 2024-12-13 | End: 2024-12-13

## 2024-12-13 RX ORDER — ACETAMINOPHEN 325 MG/1
650 TABLET ORAL EVERY 6 HOURS PRN
Status: DISCONTINUED | OUTPATIENT
Start: 2024-12-13 | End: 2024-12-16 | Stop reason: HOSPADM

## 2024-12-13 RX ORDER — PHENAZOPYRIDINE HYDROCHLORIDE 100 MG/1
100 TABLET, FILM COATED ORAL 3 TIMES DAILY PRN
Status: DISCONTINUED | OUTPATIENT
Start: 2024-12-13 | End: 2024-12-16 | Stop reason: HOSPADM

## 2024-12-13 RX ORDER — POLYETHYLENE GLYCOL 3350 17 G/17G
17 POWDER, FOR SOLUTION ORAL DAILY PRN
Status: DISCONTINUED | OUTPATIENT
Start: 2024-12-13 | End: 2024-12-16 | Stop reason: HOSPADM

## 2024-12-13 RX ORDER — BISACODYL 5 MG/1
5 TABLET, DELAYED RELEASE ORAL DAILY PRN
Status: DISCONTINUED | OUTPATIENT
Start: 2024-12-13 | End: 2024-12-16 | Stop reason: HOSPADM

## 2024-12-13 RX ORDER — AMOXICILLIN 250 MG
2 CAPSULE ORAL 2 TIMES DAILY PRN
Status: DISCONTINUED | OUTPATIENT
Start: 2024-12-13 | End: 2024-12-16 | Stop reason: HOSPADM

## 2024-12-13 RX ORDER — SODIUM CHLORIDE 0.9 % (FLUSH) 0.9 %
10 SYRINGE (ML) INJECTION AS NEEDED
Status: DISCONTINUED | OUTPATIENT
Start: 2024-12-13 | End: 2024-12-16 | Stop reason: HOSPADM

## 2024-12-13 RX ORDER — ACETAMINOPHEN 500 MG
1000 TABLET ORAL ONCE
Status: COMPLETED | OUTPATIENT
Start: 2024-12-13 | End: 2024-12-13

## 2024-12-13 RX ORDER — BISACODYL 10 MG
10 SUPPOSITORY, RECTAL RECTAL DAILY PRN
Status: DISCONTINUED | OUTPATIENT
Start: 2024-12-13 | End: 2024-12-16 | Stop reason: HOSPADM

## 2024-12-13 RX ORDER — ONDANSETRON 2 MG/ML
4 INJECTION INTRAMUSCULAR; INTRAVENOUS EVERY 6 HOURS PRN
Status: DISCONTINUED | OUTPATIENT
Start: 2024-12-13 | End: 2024-12-16 | Stop reason: HOSPADM

## 2024-12-13 RX ORDER — SODIUM CHLORIDE 9 MG/ML
40 INJECTION, SOLUTION INTRAVENOUS AS NEEDED
Status: DISCONTINUED | OUTPATIENT
Start: 2024-12-13 | End: 2024-12-16 | Stop reason: HOSPADM

## 2024-12-13 RX ORDER — ONDANSETRON 2 MG/ML
4 INJECTION INTRAMUSCULAR; INTRAVENOUS ONCE
Status: COMPLETED | OUTPATIENT
Start: 2024-12-13 | End: 2024-12-13

## 2024-12-13 RX ORDER — SODIUM CHLORIDE 0.9 % (FLUSH) 0.9 %
10 SYRINGE (ML) INJECTION EVERY 12 HOURS SCHEDULED
Status: DISCONTINUED | OUTPATIENT
Start: 2024-12-13 | End: 2024-12-16 | Stop reason: HOSPADM

## 2024-12-13 RX ADMIN — CEFTRIAXONE 2000 MG: 2 INJECTION, POWDER, FOR SOLUTION INTRAMUSCULAR; INTRAVENOUS at 23:55

## 2024-12-13 RX ADMIN — ONDANSETRON 4 MG: 2 INJECTION, SOLUTION INTRAMUSCULAR; INTRAVENOUS at 21:41

## 2024-12-13 RX ADMIN — SODIUM CHLORIDE, SODIUM LACTATE, POTASSIUM CHLORIDE, CALCIUM CHLORIDE 1000 ML: 20; 30; 600; 310 INJECTION, SOLUTION INTRAVENOUS at 21:42

## 2024-12-13 RX ADMIN — HYDROMORPHONE HYDROCHLORIDE 0.5 MG: 1 INJECTION, SOLUTION INTRAMUSCULAR; INTRAVENOUS; SUBCUTANEOUS at 21:42

## 2024-12-13 RX ADMIN — ACETAMINOPHEN 1000 MG: 500 TABLET, FILM COATED ORAL at 21:44

## 2024-12-14 PROBLEM — N12 PYELONEPHRITIS: Status: ACTIVE | Noted: 2024-12-14

## 2024-12-14 PROBLEM — R78.81 BACTEREMIA DUE TO GRAM-NEGATIVE BACTERIA: Status: ACTIVE | Noted: 2024-12-14

## 2024-12-14 PROBLEM — N10 ACUTE PYELONEPHRITIS: Status: ACTIVE | Noted: 2024-12-14

## 2024-12-14 LAB
ADV 40+41 DNA STL QL NAA+NON-PROBE: NOT DETECTED
ANION GAP SERPL CALCULATED.3IONS-SCNC: 8.4 MMOL/L (ref 5–15)
ASTRO TYP 1-8 RNA STL QL NAA+NON-PROBE: NOT DETECTED
BACTERIA BLD CULT: ABNORMAL
BASOPHILS # BLD AUTO: 0.05 10*3/MM3 (ref 0–0.2)
BASOPHILS NFR BLD AUTO: 0.4 % (ref 0–1.5)
BOTTLE TYPE: ABNORMAL
BUN SERPL-MCNC: 8 MG/DL (ref 6–20)
BUN/CREAT SERPL: 11.1 (ref 7–25)
C CAYETANENSIS DNA STL QL NAA+NON-PROBE: NOT DETECTED
C COLI+JEJ+UPSA DNA STL QL NAA+NON-PROBE: NOT DETECTED
CALCIUM SPEC-SCNC: 9.2 MG/DL (ref 8.6–10.5)
CHLORIDE SERPL-SCNC: 105 MMOL/L (ref 98–107)
CO2 SERPL-SCNC: 26.6 MMOL/L (ref 22–29)
CREAT SERPL-MCNC: 0.72 MG/DL (ref 0.57–1)
CRYPTOSP DNA STL QL NAA+NON-PROBE: NOT DETECTED
DEPRECATED RDW RBC AUTO: 38 FL (ref 37–54)
E HISTOLYT DNA STL QL NAA+NON-PROBE: NOT DETECTED
EAEC PAA PLAS AGGR+AATA ST NAA+NON-PRB: NOT DETECTED
EC STX1+STX2 GENES STL QL NAA+NON-PROBE: NOT DETECTED
EGFRCR SERPLBLD CKD-EPI 2021: 117.7 ML/MIN/1.73
EOSINOPHIL # BLD AUTO: 0.13 10*3/MM3 (ref 0–0.4)
EOSINOPHIL NFR BLD AUTO: 1.1 % (ref 0.3–6.2)
EPEC EAE GENE STL QL NAA+NON-PROBE: NOT DETECTED
ERYTHROCYTE [DISTWIDTH] IN BLOOD BY AUTOMATED COUNT: 13.6 % (ref 12.3–15.4)
ETEC LTA+ST1A+ST1B TOX ST NAA+NON-PROBE: NOT DETECTED
G LAMBLIA DNA STL QL NAA+NON-PROBE: NOT DETECTED
GLUCOSE SERPL-MCNC: 103 MG/DL (ref 65–99)
HCT VFR BLD AUTO: 39.7 % (ref 34–46.6)
HGB BLD-MCNC: 12.9 G/DL (ref 12–15.9)
IMM GRANULOCYTES # BLD AUTO: 0.04 10*3/MM3 (ref 0–0.05)
IMM GRANULOCYTES NFR BLD AUTO: 0.3 % (ref 0–0.5)
LYMPHOCYTES # BLD AUTO: 2.14 10*3/MM3 (ref 0.7–3.1)
LYMPHOCYTES NFR BLD AUTO: 18.5 % (ref 19.6–45.3)
MCH RBC QN AUTO: 25.1 PG (ref 26.6–33)
MCHC RBC AUTO-ENTMCNC: 32.5 G/DL (ref 31.5–35.7)
MCV RBC AUTO: 77.2 FL (ref 79–97)
MONOCYTES # BLD AUTO: 1.09 10*3/MM3 (ref 0.1–0.9)
MONOCYTES NFR BLD AUTO: 9.4 % (ref 5–12)
NEUTROPHILS NFR BLD AUTO: 70.3 % (ref 42.7–76)
NEUTROPHILS NFR BLD AUTO: 8.12 10*3/MM3 (ref 1.7–7)
NOROVIRUS GI+II RNA STL QL NAA+NON-PROBE: NOT DETECTED
NRBC BLD AUTO-RTO: 0 /100 WBC (ref 0–0.2)
P SHIGELLOIDES DNA STL QL NAA+NON-PROBE: NOT DETECTED
PLATELET # BLD AUTO: 298 10*3/MM3 (ref 140–450)
PMV BLD AUTO: 9 FL (ref 6–12)
POTASSIUM SERPL-SCNC: 4.1 MMOL/L (ref 3.5–5.2)
RBC # BLD AUTO: 5.14 10*6/MM3 (ref 3.77–5.28)
RVA RNA STL QL NAA+NON-PROBE: NOT DETECTED
S ENT+BONG DNA STL QL NAA+NON-PROBE: NOT DETECTED
SAPO I+II+IV+V RNA STL QL NAA+NON-PROBE: NOT DETECTED
SHIGELLA SP+EIEC IPAH ST NAA+NON-PROBE: NOT DETECTED
SODIUM SERPL-SCNC: 140 MMOL/L (ref 136–145)
V CHOL+PARA+VUL DNA STL QL NAA+NON-PROBE: NOT DETECTED
V CHOLERAE DNA STL QL NAA+NON-PROBE: NOT DETECTED
WBC NRBC COR # BLD AUTO: 11.57 10*3/MM3 (ref 3.4–10.8)
Y ENTEROCOL DNA STL QL NAA+NON-PROBE: NOT DETECTED

## 2024-12-14 PROCEDURE — 25010000002 MAGNESIUM SULFATE 2 GM/50ML SOLUTION: Performed by: EMERGENCY MEDICINE

## 2024-12-14 PROCEDURE — 25010000002 ONDANSETRON PER 1 MG

## 2024-12-14 PROCEDURE — 25010000002 DIPHENHYDRAMINE PER 50 MG: Performed by: EMERGENCY MEDICINE

## 2024-12-14 PROCEDURE — 93005 ELECTROCARDIOGRAM TRACING: CPT | Performed by: STUDENT IN AN ORGANIZED HEALTH CARE EDUCATION/TRAINING PROGRAM

## 2024-12-14 PROCEDURE — 25010000002 KETOROLAC TROMETHAMINE PER 15 MG: Performed by: EMERGENCY MEDICINE

## 2024-12-14 PROCEDURE — 87507 IADNA-DNA/RNA PROBE TQ 12-25: CPT

## 2024-12-14 PROCEDURE — 80048 BASIC METABOLIC PNL TOTAL CA: CPT

## 2024-12-14 PROCEDURE — 93010 ELECTROCARDIOGRAM REPORT: CPT | Performed by: INTERNAL MEDICINE

## 2024-12-14 PROCEDURE — 85025 COMPLETE CBC W/AUTO DIFF WBC: CPT

## 2024-12-14 PROCEDURE — 25010000002 CEFTRIAXONE PER 250 MG

## 2024-12-14 PROCEDURE — 25010000002 METOCLOPRAMIDE PER 10 MG: Performed by: EMERGENCY MEDICINE

## 2024-12-14 RX ORDER — DIPHENHYDRAMINE HYDROCHLORIDE 50 MG/ML
25 INJECTION INTRAMUSCULAR; INTRAVENOUS ONCE
Status: COMPLETED | OUTPATIENT
Start: 2024-12-14 | End: 2024-12-14

## 2024-12-14 RX ORDER — ERGOCALCIFEROL 1.25 MG/1
50000 CAPSULE, LIQUID FILLED ORAL WEEKLY
Status: DISCONTINUED | OUTPATIENT
Start: 2024-12-16 | End: 2024-12-16 | Stop reason: HOSPADM

## 2024-12-14 RX ORDER — KETOROLAC TROMETHAMINE 30 MG/ML
30 INJECTION, SOLUTION INTRAMUSCULAR; INTRAVENOUS ONCE
Status: COMPLETED | OUTPATIENT
Start: 2024-12-14 | End: 2024-12-14

## 2024-12-14 RX ORDER — SODIUM CHLORIDE 0.9 % (FLUSH) 0.9 %
10 SYRINGE (ML) INJECTION AS NEEDED
Status: DISCONTINUED | OUTPATIENT
Start: 2024-12-14 | End: 2024-12-16 | Stop reason: HOSPADM

## 2024-12-14 RX ORDER — METOCLOPRAMIDE HYDROCHLORIDE 5 MG/ML
10 INJECTION INTRAMUSCULAR; INTRAVENOUS ONCE
Status: COMPLETED | OUTPATIENT
Start: 2024-12-14 | End: 2024-12-14

## 2024-12-14 RX ORDER — MAGNESIUM SULFATE HEPTAHYDRATE 40 MG/ML
2 INJECTION, SOLUTION INTRAVENOUS ONCE
Status: COMPLETED | OUTPATIENT
Start: 2024-12-14 | End: 2024-12-14

## 2024-12-14 RX ORDER — OXYCODONE AND ACETAMINOPHEN 5; 325 MG/1; MG/1
1 TABLET ORAL EVERY 6 HOURS PRN
Status: DISCONTINUED | OUTPATIENT
Start: 2024-12-14 | End: 2024-12-16 | Stop reason: HOSPADM

## 2024-12-14 RX ADMIN — ONDANSETRON 4 MG: 2 INJECTION, SOLUTION INTRAMUSCULAR; INTRAVENOUS at 23:47

## 2024-12-14 RX ADMIN — CEFTRIAXONE 2000 MG: 2 INJECTION, POWDER, FOR SOLUTION INTRAMUSCULAR; INTRAVENOUS at 16:24

## 2024-12-14 RX ADMIN — Medication 10 ML: at 09:13

## 2024-12-14 RX ADMIN — Medication 10 ML: at 00:12

## 2024-12-14 RX ADMIN — ACETAMINOPHEN 650 MG: 325 TABLET ORAL at 17:45

## 2024-12-14 RX ADMIN — KETOROLAC TROMETHAMINE 30 MG: 30 INJECTION, SOLUTION INTRAMUSCULAR at 09:13

## 2024-12-14 RX ADMIN — SODIUM CHLORIDE, SODIUM LACTATE, POTASSIUM CHLORIDE, CALCIUM CHLORIDE 1000 ML: 20; 30; 600; 310 INJECTION, SOLUTION INTRAVENOUS at 10:26

## 2024-12-14 RX ADMIN — Medication 10 ML: at 22:18

## 2024-12-14 RX ADMIN — ACETAMINOPHEN 650 MG: 325 TABLET ORAL at 07:54

## 2024-12-14 RX ADMIN — MAGNESIUM SULFATE HEPTAHYDRATE 2 G: 40 INJECTION, SOLUTION INTRAVENOUS at 09:13

## 2024-12-14 RX ADMIN — DIPHENHYDRAMINE HYDROCHLORIDE 25 MG: 50 INJECTION, SOLUTION INTRAMUSCULAR; INTRAVENOUS at 09:13

## 2024-12-14 RX ADMIN — ACETAMINOPHEN 650 MG: 325 TABLET ORAL at 23:46

## 2024-12-14 RX ADMIN — METOCLOPRAMIDE 10 MG: 5 INJECTION, SOLUTION INTRAMUSCULAR; INTRAVENOUS at 09:12

## 2024-12-14 NOTE — PROGRESS NOTES
ED OBSERVATION PROGRESS/DISCHARGE SUMMARY    Date of Admission: 12/13/2024   LOS: 0 days   PCP: Michelle Montes APRN    Final Diagnosis sepsis with bacteremia secondary to pyelonephritis      Subjective     Hospital Outcome: Nkechi Saavedra is a 29 y.o. female with medical history significant for PCOS presents to the ED with worsening dysuria that started 10 days ago and right-sided flank pain onset about 4 days ago.  She states that she feels like she has a balloon in her pelvis and has been nauseated without vomiting.  She reports chills, and she is febrile in the ED with a temperature 101 °F.  She denies chest pain, SOA, bloody or tarry stools.  In the ED her CT A/P was unremarkable, however, UA suspicious for UTI.     She states this is her second UTI in the past few months.  She was in [facility] last month and developed a UTI and has been taking leftover antibiotics from prior illness.    12/14/2024: Patient reports that she still has some right flank pain, fevers and chills, and nausea.  Patient has 1 of 2 blood cultures positive for gram-negative bacilli.  Sepsis fluid bolus ordered.  Patient still on IV antibiotics of Rocephin.  Discussed case with hospitalist who has graciously accepted the patient for further inpatient management.    ROS:  General: no fevers, chills  Respiratory: no cough, dyspnea  Cardiovascular: no chest pain, palpitations  Abdomen: No abdominal pain, nausea, vomiting, or diarrhea  Neurologic: No focal weakness    Objective   Physical Exam:  I have reviewed the vital signs.  Temp:  [98.4 °F (36.9 °C)-101.3 °F (38.5 °C)] 99 °F (37.2 °C)  Heart Rate:  [100-125] 119  Resp:  [16-20] 18  BP: (116-143)/(74-98) 116/74  General Appearance:    Alert, cooperative, no distress, patient appears sick but not acutely toxic  Head:    Normocephalic, atraumatic, normal hearing  Eyes:    Sclerae anicteric, EOMI  Neck:   Supple, nontender  Lungs: Clear to auscultation bilaterally, respirations  unlabored on room air  Heart: Regular rate and rhythm, S1 and S2 normal, no murmur  Abdomen:  Soft, tender on right flank, bowel sounds active, nondistended, obese abdomen  Extremities: No clubbing, cyanosis, or edema to lower extremities  Pulses:  2+ and symmetric in distal lower extremities  Skin: No rashes   Neurologic: Oriented x3, Normal strength to extremities    Results Review:    I have reviewed the labs, radiology results and diagnostic studies.    Results from last 7 days   Lab Units 12/14/24  0433   WBC 10*3/mm3 11.57*   HEMOGLOBIN g/dL 12.9   HEMATOCRIT % 39.7   PLATELETS 10*3/mm3 298     Results from last 7 days   Lab Units 12/14/24  0433 12/13/24  2043   SODIUM mmol/L 140 135*   POTASSIUM mmol/L 4.1 4.0   CHLORIDE mmol/L 105 100   CO2 mmol/L 26.6 22.9   BUN mg/dL 8 7   CREATININE mg/dL 0.72 0.76   CALCIUM mg/dL 9.2 9.6   BILIRUBIN mg/dL  --  0.5   ALK PHOS U/L  --  88   ALT (SGPT) U/L  --  40*   AST (SGOT) U/L  --  26   GLUCOSE mg/dL 103* 92     Imaging Results (Last 24 Hours)       Procedure Component Value Units Date/Time    CT Abdomen Pelvis Without Contrast [[id number]] Collected: 12/13/24 2301     Updated: 12/13/24 2301    Narrative:        Patient: VERN WOLFF  Time Out: 23:00  Exam(s): CT ABDOMEN + PELVIS Without Contrast     EXAM:    CT Abdomen and Pelvis Without Intravenous Contrast    CLINICAL HISTORY:     Reason for exam: flank pain.    TECHNIQUE:    Axial computed tomography images of the abdomen and pelvis without   intravenous contrast.  CTDI is 13.94 mGy and DLP is 740.9 mGy-cm.  This   CT exam was performed according to the principle of ALARA (As Low As   Reasonably Achievable) by using one or more of the following dose   reduction techniques: automated exposure control, adjustment of the mA   and or kV according to patient size, and or use of iterative   reconstruction technique.    COMPARISON:    No relevant prior studies available.    FINDINGS:    Lung bases:   Unremarkable.  No mass.  No consolidation.     ABDOMEN:    Liver:  Unremarkable.    Gallbladder and bile ducts:  Unremarkable.  No calcified stones.  No   ductal dilation.    Pancreas:  Unremarkable.  No ductal dilation.    Spleen:  Unremarkable.  No splenomegaly.    Adrenals:  Unremarkable.  No mass.    Kidneys and ureters:  Unremarkable.  No hydronephrosis, nephrolithiasis,   or obstructive uropathy.    Stomach and bowel:  Unremarkable.  No obstruction.  No mucosal   thickening.     PELVIS:    Appendix:  No findings to suggest acute appendicitis.    Bladder:  Unremarkable.  No stones.    Reproductive:  Unremarkable as visualized.     ABDOMEN and PELVIS:    Intraperitoneal space:  Unremarkable.  No free air.  No significant   fluid collection.    Bones joints:  No acute fracture.  No dislocation.    Soft tissues:  Unremarkable.    Vasculature:  Unremarkable.  No abdominal aortic aneurysm.    Lymph nodes:  Unremarkable.  No enlarged lymph nodes.    IMPRESSION:         No hydronephrosis, nephrolithiasis, or obstructive uropathy.      Impression:          Electronically signed by Clinton Edwards MD on 12-13-24 at 2300            I have reviewed the medications.     Discharge Medications        ASK your doctor about these medications        Instructions Start Date   Clomid 50 MG tablet  Generic drug: clomiPHENE   1 tablet, Daily      naproxen 500 MG tablet  Commonly known as: NAPROSYN   500 mg, Oral, 2 Times Daily PRN      Prenatal Vitamin 27-0.8 MG tablet   1 tablet, Oral, Daily      SUMAtriptan 100 MG tablet  Commonly known as: Imitrex   Take one tablet at onset of headache. May repeat dose one time in 2 hours if headache not relieved.      vitamin D 1.25 MG (09203 UT) capsule capsule  Commonly known as: ERGOCALCIFEROL   50,000 Units, Oral, Weekly              ---------------------------------------------------------------------------------------------  Assessment & Plan   Assessment/Problem List    Abdominal  pain      Plan:    Urinary tract infection, clinically suspect pyelonephritis  Sepsis with bacteremia:  -Worsening dysuria, right flank pain  -Nitrate positive UA with blood, leuks, protein, bacteria  -CT A/P unremarkable  -Lactate 1.9  -1 of 2 blood cultures positive for gram-negative bacilli; pending final species and sensitivities  -Urine culture pending  -Empiric Rocephin  -Zofran, Pyridium as needed  -Tylenol for fever  -Sepsis bolus ordered on 12/14/2020 1 of 2 blood culture results received  -Discussed case with hospitalist who has graciously accepted the patient for further inpatient management and monitoring    Disposition: Likely discharge in 2 to 3 days depending on hospital course    Follow-up after Discharge: Dependent on hospital course    This note will serve as a progress note    Ml Barahona PA-C 12/14/24 07:21 EST    I have worn appropriate PPE during this patient encounter, sanitized my hands both with entering and exiting patient's room.      59 minutes has been spent by [facility] providers in the care of this patient while under observation status

## 2024-12-14 NOTE — PLAN OF CARE
Problem: Adult Inpatient Plan of Care  Goal: Plan of Care Review  Outcome: Progressing  Flowsheets (Taken 12/14/2024 0116)  Progress: no change  Outcome Evaluation: Pt admitted with ABD pain and dysuria for last 10 days. Temp on admission was 101.3. ST in the 120's. Blood cultures drawn. Pt receiving abx. Bacteria growing in urine shows UTI  Plan of Care Reviewed With:   patient   spouse  Goal: Patient-Specific Goal (Individualized)  Outcome: Progressing  Goal: Absence of Hospital-Acquired Illness or Injury  Outcome: Progressing  Intervention: Identify and Manage Fall Risk  Recent Flowsheet Documentation  Taken 12/14/2024 0100 by Manjeet Pinzon RN  Safety Promotion/Fall Prevention:   fall prevention program maintained   clutter free environment maintained  Intervention: Prevent Skin Injury  Recent Flowsheet Documentation  Taken 12/14/2024 0100 by Manjeet Pinzon RN  Body Position: position changed independently  Intervention: Prevent Infection  Recent Flowsheet Documentation  Taken 12/14/2024 0100 by Manjeet Pinzon RN  Infection Prevention:   single patient room provided   rest/sleep promoted  Goal: Optimal Comfort and Wellbeing  Outcome: Progressing  Intervention: Provide Person-Centered Care  Recent Flowsheet Documentation  Taken 12/14/2024 0100 by Manjeet Pinzon RN  Trust Relationship/Rapport:   care explained   choices provided  Goal: Readiness for Transition of Care  Outcome: Progressing  Intervention: Mutually Develop Transition Plan  Recent Flowsheet Documentation  Taken 12/14/2024 0051 by Manjeet Pinzon RN  Equipment Currently Used at Home: none  Transportation Anticipated: car, drives self  Patient/Family Anticipated Services at Transition: none  Patient/Family Anticipates Transition to: home with family     Problem: Sepsis/Septic Shock  Goal: Optimal Coping  Outcome: Progressing  Intervention: Support Patient and Family Response  Recent Flowsheet Documentation  Taken 12/14/2024 0100 by Manjeet Pinzon  RN  Family/Support System Care: caregiver stress acknowledged  Goal: Absence of Bleeding  Outcome: Progressing  Goal: Blood Glucose Level Within Target Range  Outcome: Progressing  Goal: Absence of Infection Signs and Symptoms  Outcome: Progressing  Intervention: Initiate Sepsis Management  Recent Flowsheet Documentation  Taken 12/14/2024 0100 by Manjeet Pinzon RN  Infection Prevention:   single patient room provided   rest/sleep promoted  Intervention: Promote Recovery  Recent Flowsheet Documentation  Taken 12/14/2024 0100 by Manjeet Pinzon RN  Activity Management: ambulated to bathroom  Goal: Optimal Nutrition Delivery  Outcome: Progressing     Problem: Pain Acute  Goal: Optimal Pain Control and Function  Outcome: Progressing  Intervention: Optimize Psychosocial Wellbeing  Recent Flowsheet Documentation  Taken 12/14/2024 0100 by Manjeet Pinzon RN  Diversional Activities:   television   smartphone  Intervention: Prevent or Manage Pain  Recent Flowsheet Documentation  Taken 12/14/2024 0100 by Manjeet Pinzon RN  Medication Review/Management: medications reviewed     Problem: Nausea and Vomiting  Goal: Nausea and Vomiting Relief  Outcome: Progressing     Problem: UTI (Urinary Tract Infection)  Goal: Improved Infection Symptoms  Outcome: Progressing   Goal Outcome Evaluation:  Plan of Care Reviewed With: patient, spouse        Progress: no change  Outcome Evaluation: Pt admitted with ABD pain and dysuria for last 10 days. Temp on admission was 101.3. ST in the 120's. Blood cultures drawn. Pt receiving abx. Bacteria growing in urine shows UTI

## 2024-12-14 NOTE — ED PROVIDER NOTES
EMERGENCY DEPARTMENT ENCOUNTER    Room Number:  14/14  PCP: Michelle Montes APRN  Historian: Patient      HPI:  Chief Complaint: Dysuria and right flank pain  A complete HPI/ROS/PMH/PSH/SH/FH are unobtainable due to: Language barrier history obtained through video   Context: Nkechi Saavedra is a 29 y.o. female who presents to the ED c/o dysuria and flank pain.  Patient states she has been having dysuria for about 10 days.  Started having right flank pain 4 days ago.  Patient states she [place].  Patient has had nausea without vomiting.  Has had some discomfort in her vaginal area.  Has had no vaginal bleeding or discharge.  Has had fevers tonight.            PAST MEDICAL HISTORY  Active Ambulatory Problems     Diagnosis Date Noted    Irregular menstrual bleeding 12/12/2022    Polyp, corpus uteri 12/13/2022    Dyspepsia 02/01/2023    Elevated liver enzymes 02/22/2023    Hair loss 02/22/2023    Encounter for hepatitis C screening test for low risk patient 02/22/2023    Changes in skin texture 02/22/2023    Elevated blood pressure reading 04/12/2023    Seasonal allergies 04/12/2023    Encounter for screening breast examination and discussion of breast self examination 04/12/2023    Mixed hyperlipidemia 04/26/2023    Class 2 obesity due to excess calories without serious comorbidity with body mass index (BMI) of 36.0 to 36.9 in adult 04/28/2023    History of attempted suicide 07/28/2023    Depression with anxiety 07/28/2023    Sinus headache 07/31/2023    Intractable migraine without aura and without status migrainosus 07/31/2023    Low TSH level 02/02/2024    Vitamin D deficiency 02/02/2024    Eye discomfort, right 07/31/2024    PCOS (polycystic ovarian syndrome) 08/06/2024    Infertility, female 08/06/2024     Resolved Ambulatory Problems     Diagnosis Date Noted    Thyroid nodule 02/22/2023     Past Medical History:   Diagnosis Date     Abnormal ultrasound of thyroid gland 02/22/2023    Fatty liver     Frequent UTI     History of peritonitis     Polycystic ovary syndrome     Thyroid disorder          PAST SURGICAL HISTORY  Past Surgical History:   Procedure Laterality Date    APPENDECTOMY      age 4    D & C HYSTEROSCOPY N/A 01/23/2023    Procedure: DILATATION AND CURETTAGE HYSTEROSCOPY WITH MYOSURE;  Surgeon: Fabricio Barrera MD;  Location: [facility] OR;  Service: Obstetrics/Gynecology;  Laterality: N/A;    DIAGNOSTIC LAPAROSCOPY      for peritonitis in childhood         FAMILY HISTORY  Family History   Problem Relation Age of Onset    Diabetes Sister     Malig Hyperthermia Neg Hx     Breast cancer Neg Hx     Ovarian cancer Neg Hx     Uterine cancer Neg Hx     Colon cancer Neg Hx          SOCIAL HISTORY  Social History     Socioeconomic History    Marital status:    Tobacco Use    Smoking status: Never     Passive exposure: Never    Smokeless tobacco: Never   Vaping Use    Vaping status: Never Used   Substance and Sexual Activity    Alcohol use: Not Currently    Drug use: Never    Sexual activity: Yes     Partners: Male     Birth control/protection: None         ALLERGIES  Provera [medroxyprogesterone] and Metformin        REVIEW OF SYSTEMS  Review of Systems   None      PHYSICAL EXAM  ED Triage Vitals   Temp Heart Rate Resp BP SpO2   12/13/24 2020 12/13/24 2020 12/13/24 2020 12/13/24 2023 12/13/24 2020   (!) 101 °F (38.3 °C) (!) 125 20 137/98 96 %      Temp src Heart Rate Source Patient Position BP Location FiO2 (%)   12/13/24 2020 -- -- -- --   Tympanic           Physical Exam      GENERAL: no acute distress  HENT: nares patent  EYES: no scleral icterus  CV: regular rhythm, normal rate  RESPIRATORY: normal effort  ABDOMEN: soft.  Right sided CVA tenderness  MUSCULOSKELETAL: no deformity  NEURO: alert, moves all extremities, follows commands  PSYCH:  calm, cooperative  SKIN: warm, dry    Vital signs and nursing notes  reviewed.          LAB RESULTS  Recent Results (from the past 24 hours)   POC Urinalysis Dipstick, Multipro (Automated Dipstick)    Collection Time: 12/13/24  7:29 PM    Specimen: Urine   Result Value Ref Range    Color Yellow     Clarity, UA Cloudy (A)     Glucose, UA Negative mg/dL    Bilirubin Negative     Ketones, UA 15 mg/dL (A)     Specific Gravity  1.015 1.005 - 1.030    Blood, UA Small (A)     pH, Urine 5.5 5.0 - 8.0    Protein, POC 30 mg/dL (A) mg/dL    Urobilinogen, UA 0.2 E.U./dL     Nitrite, UA Negative     Leukocytes Moderate (2+) (A)    Comprehensive Metabolic Panel    Collection Time: 12/13/24  8:43 PM    Specimen: Arm, Left; Blood   Result Value Ref Range    Glucose 92 65 - 99 mg/dL    BUN 7 6 - 20 mg/dL    Creatinine 0.76 0.57 - 1.00 mg/dL    Sodium 135 (L) 136 - 145 mmol/L    Potassium 4.0 3.5 - 5.2 mmol/L    Chloride 100 98 - 107 mmol/L    CO2 22.9 22.0 - 29.0 mmol/L    Calcium 9.6 8.6 - 10.5 mg/dL    Total Protein 8.7 (H) 6.0 - 8.5 g/dL    Albumin 4.6 3.5 - 5.2 g/dL    ALT (SGPT) 40 (H) 1 - 33 U/L    AST (SGOT) 26 1 - 32 U/L    Alkaline Phosphatase 88 39 - 117 U/L    Total Bilirubin 0.5 0.0 - 1.2 mg/dL    Globulin 4.1 gm/dL    A/G Ratio 1.1 g/dL    BUN/Creatinine Ratio 9.2 7.0 - 25.0    Anion Gap 12.1 5.0 - 15.0 mmol/L    eGFR 110.3 >60.0 mL/min/1.73   Lactic Acid, Plasma    Collection Time: 12/13/24  8:43 PM    Specimen: Arm, Left; Blood   Result Value Ref Range    Lactate 1.9 0.5 - 2.0 mmol/L   CBC Auto Differential    Collection Time: 12/13/24  8:43 PM    Specimen: Arm, Left; Blood   Result Value Ref Range    WBC 11.55 (H) 3.40 - 10.80 10*3/mm3    RBC 5.71 (H) 3.77 - 5.28 10*6/mm3    Hemoglobin 14.3 12.0 - 15.9 g/dL    Hematocrit 43.9 34.0 - 46.6 %    MCV 76.9 (L) 79.0 - 97.0 fL    MCH 25.0 (L) 26.6 - 33.0 pg    MCHC 32.6 31.5 - 35.7 g/dL    RDW 13.5 12.3 - 15.4 %    RDW-SD 36.9 (L) 37.0 - 54.0 fl    MPV 9.2 6.0 - 12.0 fL    Platelets 334 140 - 450 10*3/mm3    Neutrophil % 81.0 (H) 42.7 - 76.0  %    Lymphocyte % 15.2 (L) 19.6 - 45.3 %    Monocyte % 2.2 (L) 5.0 - 12.0 %    Eosinophil % 1.0 0.3 - 6.2 %    Basophil % 0.3 0.0 - 1.5 %    Immature Grans % 0.3 0.0 - 0.5 %    Neutrophils, Absolute 9.36 (H) 1.70 - 7.00 10*3/mm3    Lymphocytes, Absolute 1.75 0.70 - 3.10 10*3/mm3    Monocytes, Absolute 0.25 0.10 - 0.90 10*3/mm3    Eosinophils, Absolute 0.11 0.00 - 0.40 10*3/mm3    Basophils, Absolute 0.04 0.00 - 0.20 10*3/mm3    Immature Grans, Absolute 0.04 0.00 - 0.05 10*3/mm3    nRBC 0.0 0.0 - 0.2 /100 WBC   Green Top (Gel)    Collection Time: 12/13/24  8:43 PM   Result Value Ref Range    Extra Tube Hold for add-ons.    Lavender Top    Collection Time: 12/13/24  8:43 PM   Result Value Ref Range    Extra Tube hold for add-on    Gold Top - SST    Collection Time: 12/13/24  8:43 PM   Result Value Ref Range    Extra Tube Hold for add-ons.    Light Blue Top    Collection Time: 12/13/24  8:43 PM   Result Value Ref Range    Extra Tube Hold for add-ons.    hCG, Serum, Qualitative    Collection Time: 12/13/24  8:43 PM    Specimen: Arm, Left; Blood   Result Value Ref Range    HCG Qualitative Negative Negative   Urinalysis With Culture If Indicated - Urine, Clean Catch    Collection Time: 12/13/24  9:21 PM    Specimen: Urine, Clean Catch   Result Value Ref Range    Color, UA Yellow Yellow, Straw    Appearance, UA Turbid (A) Clear    pH, UA 5.5 5.0 - 8.0    Specific Gravity, UA 1.012 1.005 - 1.030    Glucose, UA Negative Negative    Ketones, UA Negative Negative    Bilirubin, UA Negative Negative    Blood, UA Moderate (2+) (A) Negative    Protein,  mg/dL (2+) (A) Negative    Leuk Esterase, UA Large (3+) (A) Negative    Nitrite, UA Positive (A) Negative    Urobilinogen, UA 0.2 E.U./dL 0.2 - 1.0 E.U./dL   Urinalysis, Microscopic Only - Urine, Clean Catch    Collection Time: 12/13/24  9:21 PM    Specimen: Urine, Clean Catch   Result Value Ref Range    RBC, UA Too Numerous to Count (A) None Seen, 0-2 /HPF    WBC, UA Too  Numerous to Count (A) None Seen, 0-2 /HPF    Bacteria, UA 1+ (A) None Seen /HPF    Squamous Epithelial Cells, UA 3-6 (A) None Seen, 0-2 /HPF    Hyaline Casts, UA None Seen None Seen /LPF    WBC Clumps, UA Present None Seen /HPF    Methodology Automated Microscopy        Ordered the above labs and reviewed the results.        RADIOLOGY  CT Abdomen Pelvis Without Contrast    Result Date: 12/13/2024  Patient: VERN WOLFF  Time Out: 23:00 Exam(s): CT ABDOMEN + PELVIS Without Contrast EXAM:   CT Abdomen and Pelvis Without Intravenous Contrast CLINICAL HISTORY:    Reason for exam: flank pain. TECHNIQUE:   Axial computed tomography images of the abdomen and pelvis without intravenous contrast.  CTDI is 13.94 mGy and DLP is 740.9 mGy-cm.  This CT exam was performed according to the principle of ALARA (As Low As Reasonably Achievable) by using one or more of the following dose reduction techniques: automated exposure control, adjustment of the mA and or kV according to patient size, and or use of iterative reconstruction technique. COMPARISON:   No relevant prior studies available. FINDINGS:   Lung bases:  Unremarkable.  No mass.  No consolidation.  ABDOMEN:   Liver:  Unremarkable.   Gallbladder and bile ducts:  Unremarkable.  No calcified stones.  No ductal dilation.   Pancreas:  Unremarkable.  No ductal dilation.   Spleen:  Unremarkable.  No splenomegaly.   Adrenals:  Unremarkable.  No mass.   Kidneys and ureters:  Unremarkable.  No hydronephrosis, nephrolithiasis,  or obstructive uropathy.   Stomach and bowel:  Unremarkable.  No obstruction.  No mucosal thickening.  PELVIS:   Appendix:  No findings to suggest acute appendicitis.   Bladder:  Unremarkable.  No stones.   Reproductive:  Unremarkable as visualized.  ABDOMEN and PELVIS:   Intraperitoneal space:  Unremarkable.  No free air.  No significant fluid collection.   Bones joints:  No acute fracture.  No dislocation.   Soft tissues:  Unremarkable.    Vasculature:  Unremarkable.  No abdominal aortic aneurysm.   Lymph nodes:  Unremarkable.  No enlarged lymph nodes. IMPRESSION:       No hydronephrosis, nephrolithiasis, or obstructive uropathy.     Electronically signed by Clinton Edwards MD on 12-13-24 at 2300     Ordered the above noted radiological studies.  CT abdomen and pain interpreted by me and shows no evidence of stone          PROCEDURES  Procedures          MEDICATIONS GIVEN IN ER  Medications   sodium chloride 0.9 % flush 10 mL (has no administration in time range)   cefTRIAXone (ROCEPHIN) 2,000 mg in sodium chloride 0.9 % 100 mL MBP (has no administration in time range)   lactated ringers bolus 1,000 mL (1,000 mL Intravenous New Bag 12/13/24 2142)   HYDROmorphone (DILAUDID) injection 0.5 mg (0.5 mg Intravenous Given 12/13/24 2142)   ondansetron (ZOFRAN) injection 4 mg (4 mg Intravenous Given 12/13/24 2141)   acetaminophen (TYLENOL) tablet 1,000 mg (1,000 mg Oral Given 12/13/24 2144)                   MEDICAL DECISION MAKING, PROGRESS, and CONSULTS    All labs have been independently reviewed by me.  All radiology studies have been reviewed by me and I have also reviewed the radiology report.   EKG's independently viewed and interpreted by me.  Discussion below represents my analysis of pertinent findings related to patient's condition, differential diagnosis, treatment plan and final disposition.      Additional sources:  - Discussed/ obtained information from independent historians: None    - External (non-ED) record review: Epic reviewed patient seen at urgent care today for pyelonephritis    - Chronic or social conditions impacting care: None    - Shared decision making: None      Orders placed during this visit:  Orders Placed This Encounter   Procedures    Blood Culture - Blood,    Blood Culture - Blood,    Urine Culture - Urine,    CT Abdomen Pelvis Without Contrast    Comprehensive Metabolic Panel    Lactic Acid, Plasma    [place] Draw    Urinalysis  With Culture If Indicated - Urine, Clean Catch    CBC Auto Differential    hCG, Serum, Qualitative    Urinalysis, Microscopic Only - Urine, Clean Catch    Undress & Gown    Continuous Pulse Oximetry    Vital Signs    Oxygen Therapy- Nasal Cannula; Titrate 1-6 LPM Per SpO2; 90 - 95%    Insert Peripheral IV    CBC & Differential    Green Top (Gel)    Lavender Top    Gold Top - SST    Light Blue Top         Additional orders considered but not ordered:  None        Differential diagnosis includes but is not limited to:    Pyelonephritis versus kidney stone      Independent interpretation of labs, radiology studies, and discussions with consultants:  ED Course as of 12/13/24 2305   Fri Dec 13, 2024   2253 22:54 EST  Patient presents for fever flank pain dysuria.  Patient appears to have pyelonephritis.  Has been given Rocephin.  Cultures have been obtained but patient has been on antibiotics.  Has been discussed with [employer] [facility] with obs unit. [SL]      ED Course User Index  [SL] Suraj Capps MD                 DIAGNOSIS  Final diagnoses:   Pyelonephritis of right kidney         DISPOSITION  admit            Latest Documented Vital Signs:  As of 23:05 EST  BP- 130/80 HR- 112 Temp- (!) 101 °F (38.3 °C) (Tympanic) O2 sat- 97%              --    Please note that portions of this were completed with a voice recognition program.       Note Disclaimer: At [facility], we believe that sharing information builds trust and better relationships. You are receiving this note because you are receiving care at [facility] or recently visited. It is possible you will see health information before a provider has talked with you about it. This kind of information can be easy to misunderstand. To help you fully understand what it means for your health, we urge you to discuss this note with your provider.            Suraj Capps MD  12/13/24 [id number]

## 2024-12-14 NOTE — H&P
[facility]   HISTORY AND PHYSICAL    Patient Name: Nkechi Saavedra  : 1995  MRN: [id number]  Primary Care Physician:  Michelle Montes APRN  Date of admission: 2024    Subjective   Subjective     Chief Complaint:   Chief Complaint   Patient presents with    Abdominal Pain         HPI:    Nkechi Saavedra is a 29 y.o. female with medical history significant for PCOS presents to the ED with worsening dysuria that started 10 days ago and right-sided flank pain onset about 4 days ago.  She states that she feels like she has a balloon in her pelvis and has been nauseated without vomiting.  She reports chills, and she is febrile in the ED with a temperature 101 °F.  She denies chest pain, SOA, bloody or tarry stools.  In the ED her CT A/P was unremarkable, however, UA suspicious for UTI.    She states this is her second UTI in the past few months.  She was in [place] last month and developed a UTI and has been taking leftover antibiotics from prior illness.    Review of Systems   All systems were reviewed and negative except for: Dysuria, nausea, right flank pain      Personal History     Past Medical History:   Diagnosis Date    Abnormal ultrasound of thyroid gland 2023    Fatty liver     Frequent UTI     History of peritonitis     childhood    Polycystic ovary syndrome     Polyp, corpus uteri     Thyroid disorder     Thyroid nodule 2023       Past Surgical History:   Procedure Laterality Date    APPENDECTOMY      age 4    D & C HYSTEROSCOPY N/A 2023    Procedure: DILATATION AND CURETTAGE HYSTEROSCOPY WITH MYOSURE;  Surgeon: Fabricio Barrera MD;  Location: [facility];  Service: Obstetrics/Gynecology;  Laterality: N/A;    DIAGNOSTIC LAPAROSCOPY      for peritonitis in childhood       Family History: family history includes Diabetes in her sister. Otherwise pertinent FHx was reviewed and not pertinent to current issue.    Social History:  reports  that she has never smoked. She has never been exposed to tobacco smoke. She has never used smokeless tobacco. She reports that she does not currently use alcohol. She reports that she does not use drugs.    Home Medications:  Prenatal Vitamin, SUMAtriptan, clomiPHENE, naproxen, and vitamin D    Allergies:  Allergies   Allergen Reactions    Provera [Medroxyprogesterone] Other (See Comments)     Suicidal Thoughts./ depression    Metformin GI Intolerance       Objective   Objective     Vitals:   Temp:  [101 °F (38.3 °C)-101.3 °F (38.5 °C)] 101 °F (38.3 °C)  Heart Rate:  [109-125] 112  Resp:  [16-20] 16  BP: (121-143)/(76-98) 127/76   Physical Exam:     Constitutional: Awake, alert. Well developed for age. Nontoxic appearing.   Eyes: PERRL x2, sclerae anicteric, no conjunctival injection. No EOM abnormalities.   HENT: NCAT, mucous membranes moist,   Neck: Supple, no thyromegaly, no lymphadenopathy, trachea midline  Respiratory: Clear to auscultation bilaterally, nonlabored respirations   Cardiovascular: RRR, no murmurs, rubs, or gallops, palpable pedal pulses bilaterally. No appreciable edema.   Gastrointestinal: Positive bowel sounds, soft, nontender, not distended.   Musculoskeletal: No bilateral ankle edema, no clubbing or cyanosis to extremities. No obvious deformities.   Psychiatric: Appropriate affect, cooperative. Converses appropriately for age.   Neurologic: Oriented x 3, strength symmetric in all extremities. Cranial nerves grossly intact to confrontation, speech clear  Skin: No rashes, skin intact.     Result Review    Result Review:  I have personally reviewed the results from the time of this admission to 12/14/2024 00:22 EST and agree with these findings:  [x]  Laboratory list / accordion  []  Microbiology  [x]  Radiology  []  EKG/Telemetry   []  Cardiology/Vascular   []  Pathology  []  Old records  []  Other:  Most notable findings include: Nitrite positive UA with 2+ blood, 3+ leuks, 2+ protein, too many  WBC RBCs to count, 1+ bacteria and 3-6 epithelial cells.  CT A/P is unremarkable, WBC 11.5, hCG negative, lactate 1.9, unremarkable CMP.      Assessment & Plan   Assessment / Plan     Brief Patient Summary:  Nkechi Saavedra is a 29 y.o. female who presents with dysuria worsening over the last 10 days and right flank pain that started 4 days ago complaining of feeling like she has a balloon in her bladder.  In the ED CT A/P is unremarkable however, UA is suspicious for UTI.    Active Hospital Problems:  Active Hospital Problems    Diagnosis     **Abdominal pain      Plan:     Urinary tract infection  -Worsening dysuria, right flank pain  -Nitrate positive UA with blood, leuks, protein, bacteria  -CT A/P unremarkable  -Lactate 1.9  -Blood and urine cultures pending  -Empiric Rocephin  -Zofran, Pyridium as needed  -Tylenol for fever  -Surveillance labs      VTE Prophylaxis:  Mechanical VTE prophylaxis orders are present.        CODE STATUS:    Level Of Support Discussed With: Patient  Code Status (Patient has no pulse and is not breathing): CPR (Attempt to Resuscitate)  Medical Interventions (Patient has pulse or is breathing): Full Support    Admission Status:  I believe this patient meets observation status.    Electronically signed by MIAH Greer, 12/14/24, 12:22 AM EST.        75 minutes has been spent by [facility] providers in the care of this patient while under observation status      I have worn appropriate PPE during this patient encounter, sanitized my hands both with entering and exiting patient's room.    I have discussed plan of care with patient including advance care plan and/or surrogate decision maker.  Patient advises that their spouse, Phuc Rowley, will be their primary surrogate decision maker

## 2024-12-14 NOTE — PROGRESS NOTES
Patient Care Team:  Michelle Montes APRN as PCP - General (Nurse Practitioner)  Kassie Sotelo MD as Obstetrician (Obstetrics and Gynecology)     TERESA MONSALVE H&P Note    I supervised care provided by the midlevel provider. We have discussed this patient's history, physical exam, and treatment plan. I have reviewed the midlevel provider's note and I agree with the midlevel provider's findings and plan of care.   SHARED VISIT: This visit was performed by BOTH a physician and an APC. The substantive portion of the medical decision making was performed by this attesting physician who made or approved the management plan and takes responsibility for patient management.   I have personally had a face to face encounter with the patient.   My personal findings are documented below:    History:  29-year-old female with history of PCOS presents with right flank pain And dysuria, nausea with no vomiting, found to be febrile in the ED with temperature of 101.  Patient septic with urinary tract infection, leukocytosis but lactic acid was normal, CT abdomen pelvis negative for acute pathology.    Physical Exam:  General: No acute distress.  HENT: NCAT, PERRL, Nares patent.  Eyes: no scleral icterus.  Neck: trachea midline, no ROM limitations.  CV: regular rhythm, tachycardiac rate.  Respiratory: normal effort, CTAB.  Abdomen: soft, nondistended, NTTP, no rebound tenderness, no guarding or rigidity.  Musculoskeletal: no deformity.  Neuro: alert, moves all extremities, follows commands.  Skin: warm, dry.    Assessment and Plan:  Patient admitted to the observation unit, blood cultures and urine cultures obtained, treating with Rocephin, antiemetics as needed, pain control, trending labs.  Blood culture positive for gram-negative bacteria.  Patient has septicemia, patient meets for inpatient, transferring care to hospitalist.

## 2024-12-14 NOTE — ED NOTES
Nursing report ED to floor  Nkechi Saavedra  29 y.o.  female    HPI :  HPI  Stated Reason for Visit: patient reports dysuria for the last week, abdominal pain x 3 days. fever today, was seen at urgent care and sent to ED for further eval.  History Obtained From: patient    Chief Complaint  Chief Complaint   Patient presents with    Abdominal Pain       Admitting doctor:   Adam Hernandez MD    Admitting diagnosis:   The encounter diagnosis was Pyelonephritis of right kidney.    Code status:   Current Code Status       Date Active Code Status Order ID Comments User Context       12/13/2024 2336 CPR (Attempt to Resuscitate) [id number]  Adolfo Singh APRN ED        Question Answer    Code Status (Patient has no pulse and is not breathing) CPR (Attempt to Resuscitate)    Medical Interventions (Patient has pulse or is breathing) Full Support    Level Of Support Discussed With Patient                    Allergies:   Provera [medroxyprogesterone] and Metformin    Isolation:   No active isolations    Intake and Output  No intake or output data in the 24 hours ending 12/14/24 0009    Weight:       12/13/24 2023   Weight: 94.8 kg (209 lb)       Most recent vitals:   Vitals:    12/13/24 2143 12/13/24 2203 12/13/24 2232 12/13/24 2302   BP: 131/76 130/80 121/78 127/76   Pulse: 116 112 109 112   Resp:  16  16   Temp:       TempSrc:       SpO2: 98% 97% 98% 95%   Weight:       Height:           Active LDAs/IV Access:   Lines, Drains & Airways       Active LDAs       Name Placement date Placement time Site Days    Peripheral IV 12/13/24 2140 Left Antecubital 12/13/24 2140  Antecubital  less than 1                    Labs (abnormal labs have a star):   Labs Reviewed   COMPREHENSIVE METABOLIC PANEL - Abnormal; Notable for the following components:       Result Value    Sodium 135 (*)     Total Protein 8.7 (*)     ALT (SGPT) 40 (*)     All other components within normal limits    Narrative:     GFR Categories in Chronic Kidney  Disease (CKD)      GFR Category          GFR (mL/min/1.73)    Interpretation  G1                     90 or greater         Normal or high (1)  G2                      60-89                Mild decrease (1)  G3a                   45-59                Mild to moderate decrease  G3b                   30-44                Moderate to severe decrease  G4                    15-29                Severe decrease  G5                    14 or less           Kidney failure          (1)In the absence of evidence of kidney disease, neither GFR category G1 or G2 fulfill the criteria for CKD.    eGFR calculation 2021 CKD-EPI creatinine equation, which does not include race as a factor   URINALYSIS W/ CULTURE IF INDICATED - Abnormal; Notable for the following components:    Appearance, UA Turbid (*)     Blood, UA Moderate (2+) (*)     Protein,  mg/dL (2+) (*)     Leuk Esterase, UA Large (3+) (*)     Nitrite, UA Positive (*)     All other components within normal limits    Narrative:     In absence of clinical symptoms, the presence of pyuria, bacteria, and/or nitrites on the urinalysis result does not correlate with infection.   CBC WITH AUTO DIFFERENTIAL - Abnormal; Notable for the following components:    WBC 11.55 (*)     RBC 5.71 (*)     MCV 76.9 (*)     MCH 25.0 (*)     RDW-SD 36.9 (*)     Neutrophil % 81.0 (*)     Lymphocyte % 15.2 (*)     Monocyte % 2.2 (*)     Neutrophils, Absolute 9.36 (*)     All other components within normal limits   URINALYSIS, MICROSCOPIC ONLY - Abnormal; Notable for the following components:    RBC, UA Too Numerous to Count (*)     WBC, UA Too Numerous to Count (*)     Bacteria, UA 1+ (*)     Squamous Epithelial Cells, UA 3-6 (*)     All other components within normal limits   LACTIC ACID, PLASMA - Normal   HCG, SERUM, QUALITATIVE - Normal   BLOOD CULTURE   BLOOD CULTURE   URINE CULTURE   GASTROINTESTINAL PANEL, PCR (PREFERRED) DOES NOT INCLUDE CDIFF   RAINBOW DRAW    Narrative:     The  following orders were created for panel order [place] Draw.  Procedure                               Abnormality         Status                     ---------                               -----------         ------                     Green Top (Gel)[[id number]]                                  Final result               Lavender Top[[id number]]                                     Final result               Gold Top - SST[[id number]]                                   Final result               Light Blue Top[[id number]]                                   Final result                 Please view results for these tests on the individual orders.   CBC WITH AUTO DIFFERENTIAL   BASIC METABOLIC PANEL   CBC AND DIFFERENTIAL    Narrative:     The following orders were created for panel order CBC & Differential.  Procedure                               Abnormality         Status                     ---------                               -----------         ------                     CBC Auto Differential[[id number]]        Abnormal            Final result                 Please view results for these tests on the individual orders.   GREEN TOP   LAVENDER TOP   GOLD TOP - SST   LIGHT BLUE TOP       EKG:   No orders to display       Meds given in ED:   Medications   sodium chloride 0.9 % flush 10 mL (has no administration in time range)   cefTRIAXone (ROCEPHIN) 2,000 mg in sodium chloride 0.9 % 100 mL MBP (2,000 mg Intravenous New Bag 12/13/24 4678)   sodium chloride 0.9 % flush 10 mL (has no administration in time range)   sodium chloride 0.9 % flush 10 mL (has no administration in time range)   sodium chloride 0.9 % infusion 40 mL (has no administration in time range)   Potassium Replacement - Follow Nurse / BPA Driven Protocol (has no administration in time range)   Magnesium Standard Dose Replacement - Follow Nurse / BPA Driven Protocol (has no administration in time range)   Phosphorus Replacement - Follow Nurse / BPA Driven  Protocol (has no administration in time range)   Calcium Replacement - Follow Nurse / BPA Driven Protocol (has no administration in time range)   sennosides-docusate (PERICOLACE) 8.6-50 MG per tablet 2 tablet (has no administration in time range)     And   polyethylene glycol (MIRALAX) packet 17 g (has no administration in time range)     And   bisacodyl (DULCOLAX) EC tablet 5 mg (has no administration in time range)     And   bisacodyl (DULCOLAX) suppository 10 mg (has no administration in time range)   phenazopyridine (PYRIDIUM) tablet 100 mg (has no administration in time range)   ondansetron (ZOFRAN) injection 4 mg (has no administration in time range)   acetaminophen (TYLENOL) tablet 650 mg (has no administration in time range)   lactated ringers bolus 1,000 mL (0 mL Intravenous Stopped 12/14/24 0008)   HYDROmorphone (DILAUDID) injection 0.5 mg (0.5 mg Intravenous Given 12/13/24 2142)   ondansetron (ZOFRAN) injection 4 mg (4 mg Intravenous Given 12/13/24 2141)   acetaminophen (TYLENOL) tablet 1,000 mg (1,000 mg Oral Given 12/13/24 2144)       Imaging results:  CT Abdomen Pelvis Without Contrast    Result Date: 12/13/2024  Electronically signed by Clinton Edwards MD on 12-13-24 at 2300     Ambulatory status:   - independent    Social issues:   Social History     Socioeconomic History    Marital status:    Tobacco Use    Smoking status: Never     Passive exposure: Never    Smokeless tobacco: Never   Vaping Use    Vaping status: Never Used   Substance and Sexual Activity    Alcohol use: Not Currently    Drug use: Never    Sexual activity: Yes     Partners: Male     Birth control/protection: None       Peripheral Neurovascular       Neuro Cognitive  Sedation Group  POSS (Pasero Opioid-Induced Sed Scale): 1 - Awake and alert    Learning       Respiratory       Abdominal Pain       Pain Assessments  Pain (Adult)  (0-10) Pain Rating: Rest: 3  Pain Location: flank  Pain Side/Orientation: right  Response to Pain  Interventions: interventions effective per patient    NIH Stroke Scale       Fátima Brunson RN  12/14/24 00:09 EST

## 2024-12-14 NOTE — PLAN OF CARE
Problem: Adult Inpatient Plan of Care  Goal: Absence of Hospital-Acquired Illness or Injury  Intervention: Identify and Manage Fall Risk  Recent Flowsheet Documentation  Taken 12/14/2024 1800 by Tarah Gonzales RN  Safety Promotion/Fall Prevention:   activity supervised   assistive device/personal items within reach   clutter free environment maintained   room organization consistent   safety round/check completed     Problem: Adult Inpatient Plan of Care  Goal: Absence of Hospital-Acquired Illness or Injury  Intervention: Prevent Skin Injury  Recent Flowsheet Documentation  Taken 12/14/2024 1800 by Tarah Gonzales RN  Body Position:   turned   supine   lower extremity elevated   Goal Outcome Evaluation:  Plan of Care Reviewed With: patient        Progress: no change  Outcome Evaluation: Patient AO x 4 independent for ADL's up ad lid walk to the toilet independently, received IV ABT febril 99.9 giuve her tylenol ,patient state have this infection that coming and go for two months paulo continue monitor.

## 2024-12-14 NOTE — PROGRESS NOTES
Name: Nkechi Saavedra ADMIT: 2024   : 1995  PCP: Michelle Montes APRN    MRN: [id number] LOS: 0 days   AGE/SEX: 29 y.o. female  ROOM: [facility]     Subjective   Subjective   Patient seen this morning, spouse present at bedside.  Continues to complain of right flank pain, received pain medication earlier and it seems to be helping.  Denies current nausea or vomiting.    Review of Systems    As above  Objective   Objective   Vital Signs  Temp:  [97.8 °F (36.6 °C)-101.3 °F (38.5 °C)] 98.4 °F (36.9 °C)  Heart Rate:  [] 83  Resp:  [16-20] 18  BP: (110-143)/(55-98) 135/76  SpO2:  [94 %-100 %] 97 %  on   ;   Device (Oxygen Therapy): room air  Body mass index is 36.97 kg/m².  Physical Exam    General: Alert and oriented x3, no acute distress  HEENT: Normocephalic, atraumatic  CV: Regular rate and rhythm, no murmurs rubs or gallops  Lungs: Clear to auscultation bilaterally, no crackles or wheezes  Abdomen: Soft, + right flank TTP  Extremities: No significant peripheral edema , no cyanosis     Results Review     I reviewed the patient's new clinical results.  Results from last 7 days   Lab Units 24  0433 12/13/24  2043   WBC 10*3/mm3 11.57* 11.55*   HEMOGLOBIN g/dL 12.9 14.3   PLATELETS 10*3/mm3 298 334     Results from last 7 days   Lab Units 24  0433 24   SODIUM mmol/L 140 135*   POTASSIUM mmol/L 4.1 4.0   CHLORIDE mmol/L 105 100   CO2 mmol/L 26.6 22.9   BUN mg/dL 8 7   CREATININE mg/dL 0.72 0.76   GLUCOSE mg/dL 103* 92   Estimated Creatinine Clearance: 128.4 mL/min (by C-G formula based on SCr of 0.72 mg/dL).  Results from last 7 days   Lab Units 24   ALBUMIN g/dL 4.6   BILIRUBIN mg/dL 0.5   ALK PHOS U/L 88   AST (SGOT) U/L 26   ALT (SGPT) U/L 40*     Results from last 7 days   Lab Units 24  0433 24   CALCIUM mg/dL 9.2 9.6   ALBUMIN g/dL  --  4.6     Results from last 7 days   Lab Units 24   LACTATE mmol/L 1.9   No results  "found for: \"COVID19\"  No results found for: \"HGBA1C\", \"POCGLU\"        CT Abdomen Pelvis Without Contrast  Narrative: Patient: VERN WOLFF  Time Out: 23:00  Exam(s): CT ABDOMEN + PELVIS Without Contrast     EXAM:    CT Abdomen and Pelvis Without Intravenous Contrast    CLINICAL HISTORY:     Reason for exam: flank pain.    TECHNIQUE:    Axial computed tomography images of the abdomen and pelvis without   intravenous contrast.  CTDI is 13.94 mGy and DLP is 740.9 mGy-cm.  This   CT exam was performed according to the principle of ALARA (As Low As   Reasonably Achievable) by using one or more of the following dose   reduction techniques: automated exposure control, adjustment of the mA   and or kV according to patient size, and or use of iterative   reconstruction technique.    COMPARISON:    No relevant prior studies available.    FINDINGS:    Lung bases:  Unremarkable.  No mass.  No consolidation.     ABDOMEN:    Liver:  Unremarkable.    Gallbladder and bile ducts:  Unremarkable.  No calcified stones.  No   ductal dilation.    Pancreas:  Unremarkable.  No ductal dilation.    Spleen:  Unremarkable.  No splenomegaly.    Adrenals:  Unremarkable.  No mass.    Kidneys and ureters:  Unremarkable.  No hydronephrosis, nephrolithiasis,   or obstructive uropathy.    Stomach and bowel:  Unremarkable.  No obstruction.  No mucosal   thickening.     PELVIS:    Appendix:  No findings to suggest acute appendicitis.    Bladder:  Unremarkable.  No stones.    Reproductive:  Unremarkable as visualized.     ABDOMEN and PELVIS:    Intraperitoneal space:  Unremarkable.  No free air.  No significant   fluid collection.    Bones joints:  No acute fracture.  No dislocation.    Soft tissues:  Unremarkable.    Vasculature:  Unremarkable.  No abdominal aortic aneurysm.    Lymph nodes:  Unremarkable.  No enlarged lymph nodes.    IMPRESSION:         No hydronephrosis, nephrolithiasis, or obstructive uropathy.  Impression: " Electronically signed by Clinton Edwards MD on 12-13-24 at 2300    Scheduled Medications  cefTRIAXone, 2,000 mg, Intravenous, Q24H  sodium chloride, 10 mL, Intravenous, Q12H  [START ON 12/16/2024] vitamin D, 50,000 Units, Oral, Weekly    Infusions   Diet  Diet: Regular/House; Fluid Consistency: Thin (IDDSI 0)    I have personally reviewed     [x]  Laboratory   [x]  Microbiology   [x]  Radiology   [x]  EKG/Telemetry  [x]  Cardiology/Vascular   []  Pathology    []  Records       Assessment/Plan     Active Hospital Problems    Diagnosis  POA    **Acute pyelonephritis [N10]  Unknown    Bacteremia due to Gram-negative bacteria [R78.81]  Unknown    Pyelonephritis [N12]  Yes    Abdominal pain [R10.9]  Yes      Resolved Hospital Problems   No resolved problems to display.       Patient is a 29 y.o. female with medical history significant for PCOS presents to the ED on 12/14/2024 with worsening dysuria that started 10 days ago and right-sided flank pain but started 4 days prior to admission.  Patient was initially admitted to observation unit in the ED,, initiated on IV fluids, IV ceftriaxone, urine and blood cultures were obtained.  Blood culture came back positive for E. coli, and A was consulted for assumption of care and further evaluation in the setting of positive blood cultures    Acute pyelonephritis  E. coli bacteremia  Sepsis  -Patient was tachycardic on admission, Tmax 101.1, meeting criteria for sepsis  -Urinalysis on admission was positive for nitrates, large leukocytes, RBC, WBC, 1+ bacteria.  -CT abdomen pelvis on admission with no hydronephrosis, nephrolithiasis, or obstructive uropathy, however was performed without contrast.  Clinically symptoms consistent with pyelonephritis.  Patient with tenderness to  palpation in the right flank.  -Blood cultures positive for E. coli, sensitivity pending  -Initiate as needed Percocet 5 mg every 6 hours for pain  -ID consult      SCDs for DVT prophylaxis.  Full  code.  Discussed with patient.  Expected Discharge Date: 12/16/2024; Expected Discharge Time:        Copied text in this note has been reviewed and is accurate as of 12/14/24.         Dictated utilizing Dragon dictation        Isaías Griffin MD  [facility]  12/14/24  16:29 EST

## 2024-12-15 LAB
ALBUMIN SERPL-MCNC: 3.5 G/DL (ref 3.5–5.2)
ALBUMIN/GLOB SERPL: 1.3 G/DL
ALP SERPL-CCNC: 63 U/L (ref 39–117)
ALT SERPL W P-5'-P-CCNC: 26 U/L (ref 1–33)
ANION GAP SERPL CALCULATED.3IONS-SCNC: 10 MMOL/L (ref 5–15)
AST SERPL-CCNC: 16 U/L (ref 1–32)
BILIRUB SERPL-MCNC: 0.2 MG/DL (ref 0–1.2)
BUN SERPL-MCNC: 8 MG/DL (ref 6–20)
BUN/CREAT SERPL: 11.4 (ref 7–25)
CALCIUM SPEC-SCNC: 8.3 MG/DL (ref 8.6–10.5)
CHLORIDE SERPL-SCNC: 106 MMOL/L (ref 98–107)
CO2 SERPL-SCNC: 23 MMOL/L (ref 22–29)
CREAT SERPL-MCNC: 0.7 MG/DL (ref 0.57–1)
DEPRECATED RDW RBC AUTO: 37.7 FL (ref 37–54)
EGFRCR SERPLBLD CKD-EPI 2021: 121.7 ML/MIN/1.73
ERYTHROCYTE [DISTWIDTH] IN BLOOD BY AUTOMATED COUNT: 13.5 % (ref 12.3–15.4)
GLOBULIN UR ELPH-MCNC: 2.8 GM/DL
GLUCOSE SERPL-MCNC: 100 MG/DL (ref 65–99)
HCT VFR BLD AUTO: 34.8 % (ref 34–46.6)
HGB BLD-MCNC: 11.3 G/DL (ref 12–15.9)
MAGNESIUM SERPL-MCNC: 2.3 MG/DL (ref 1.6–2.6)
MCH RBC QN AUTO: 24.9 PG (ref 26.6–33)
MCHC RBC AUTO-ENTMCNC: 32.5 G/DL (ref 31.5–35.7)
MCV RBC AUTO: 76.8 FL (ref 79–97)
PHOSPHATE SERPL-MCNC: 3.2 MG/DL (ref 2.5–4.5)
PLATELET # BLD AUTO: 247 10*3/MM3 (ref 140–450)
PMV BLD AUTO: 9.3 FL (ref 6–12)
POTASSIUM SERPL-SCNC: 3.9 MMOL/L (ref 3.5–5.2)
PROT SERPL-MCNC: 6.3 G/DL (ref 6–8.5)
QT INTERVAL: 318 MS
QTC INTERVAL: 416 MS
RBC # BLD AUTO: 4.53 10*6/MM3 (ref 3.77–5.28)
SODIUM SERPL-SCNC: 139 MMOL/L (ref 136–145)
WBC NRBC COR # BLD AUTO: 13.43 10*3/MM3 (ref 3.4–10.8)

## 2024-12-15 PROCEDURE — 25010000002 CEFTRIAXONE PER 250 MG

## 2024-12-15 PROCEDURE — 85027 COMPLETE CBC AUTOMATED: CPT | Performed by: STUDENT IN AN ORGANIZED HEALTH CARE EDUCATION/TRAINING PROGRAM

## 2024-12-15 PROCEDURE — 83735 ASSAY OF MAGNESIUM: CPT | Performed by: STUDENT IN AN ORGANIZED HEALTH CARE EDUCATION/TRAINING PROGRAM

## 2024-12-15 PROCEDURE — 84100 ASSAY OF PHOSPHORUS: CPT | Performed by: STUDENT IN AN ORGANIZED HEALTH CARE EDUCATION/TRAINING PROGRAM

## 2024-12-15 PROCEDURE — 80053 COMPREHEN METABOLIC PANEL: CPT | Performed by: STUDENT IN AN ORGANIZED HEALTH CARE EDUCATION/TRAINING PROGRAM

## 2024-12-15 PROCEDURE — 87040 BLOOD CULTURE FOR BACTERIA: CPT | Performed by: INTERNAL MEDICINE

## 2024-12-15 RX ORDER — SUMATRIPTAN SUCCINATE 100 MG/1
100 TABLET ORAL DAILY PRN
Status: DISCONTINUED | OUTPATIENT
Start: 2024-12-15 | End: 2024-12-16 | Stop reason: HOSPADM

## 2024-12-15 RX ADMIN — CEFTRIAXONE 2000 MG: 2 INJECTION, POWDER, FOR SOLUTION INTRAMUSCULAR; INTRAVENOUS at 17:47

## 2024-12-15 RX ADMIN — ACETAMINOPHEN 650 MG: 325 TABLET ORAL at 08:34

## 2024-12-15 RX ADMIN — PHENAZOPYRIDINE 100 MG: 100 TABLET ORAL at 08:34

## 2024-12-15 RX ADMIN — Medication 10 ML: at 08:34

## 2024-12-15 RX ADMIN — ACETAMINOPHEN 650 MG: 325 TABLET ORAL at 21:01

## 2024-12-15 RX ADMIN — Medication 10 ML: at 21:01

## 2024-12-15 RX ADMIN — PHENAZOPYRIDINE 100 MG: 100 TABLET ORAL at 21:01

## 2024-12-15 RX ADMIN — SUMATRIPTAN SUCCINATE 100 MG: 100 TABLET ORAL at 12:14

## 2024-12-15 NOTE — PLAN OF CARE
Problem: Adult Inpatient Plan of Care  Goal: Absence of Hospital-Acquired Illness or Injury  Intervention: Identify and Manage Fall Risk  Recent Flowsheet Documentation  Taken 12/15/2024 1759 by Tarah Gonzales RN  Safety Promotion/Fall Prevention:   room organization consistent   safety round/check completed  Taken 12/15/2024 1407 by Tarah Gonzales RN  Safety Promotion/Fall Prevention:   room organization consistent   safety round/check completed   activity supervised   assistive device/personal items within reach  Taken 12/15/2024 1000 by Tarah Gonzales RN  Safety Promotion/Fall Prevention:   safety round/check completed   room organization consistent   assistive device/personal items within reach   activity supervised   clutter free environment maintained  Taken 12/15/2024 0834 by Tarah Gonzales RN  Safety Promotion/Fall Prevention:   safety round/check completed   room organization consistent   assistive device/personal items within reach   activity supervised     Problem: Adult Inpatient Plan of Care  Goal: Absence of Hospital-Acquired Illness or Injury  Intervention: Prevent Skin Injury  Recent Flowsheet Documentation  Taken 12/15/2024 1759 by Tarah Gonzales RN  Body Position:   turned   supine   lower extremity elevated   position changed independently  Taken 12/15/2024 1407 by Tarah Gonzales RN  Body Position: dangle, side of bed  Taken 12/15/2024 1000 by Tarah Gonzales RN  Body Position:   turned   tilted   neutral body alignment   right  Taken 12/15/2024 0834 by Tarah Gonzales RN  Body Position:   lower extremity elevated   supine   Goal Outcome Evaluation:  Plan of Care Reviewed With: patient        Progress: improving  Outcome Evaluation: Patient AO x 4 febril in the morning and afebril in the afternoon IV ABT given tolerated diet ambulate in the room took a shower independent, victoriano continue moniotr.

## 2024-12-15 NOTE — PLAN OF CARE
Pt continues to run low grade fever with temperature ranging from 100.9 to 99 F this shift.  Pt continues to report right flank pain and pain on urination.  Pt administered PRN Tylenol once this shift with no further complaints.  Will continue to monitor and observe.     Goal Outcome Evaluation:  Plan of Care Reviewed With: patient        Progress: no change      Problem: Adult Inpatient Plan of Care  Goal: Plan of Care Review  Outcome: Progressing  Flowsheets (Taken 12/15/2024 3377)  Progress: no change  Plan of Care Reviewed With: patient     Problem: Sepsis/Septic Shock  Goal: Optimal Coping  Outcome: Progressing     Problem: Sepsis/Septic Shock  Goal: Absence of Infection Signs and Symptoms  Outcome: Not Progressing     Problem: Nausea and Vomiting  Goal: Nausea and Vomiting Relief  Outcome: Progressing     Problem: UTI (Urinary Tract Infection)  Goal: Improved Infection Symptoms  Outcome: Not Progressing

## 2024-12-15 NOTE — PROGRESS NOTES
Name: Nkechi Saavedra ADMIT: 2024   : 1995  PCP: Michelle Montes APRN    MRN: [id number] LOS: 1 days   AGE/SEX: 29 y.o. female  ROOM: [facility]     Subjective   Subjective   Right flank pain is better, fever curve is improving.  No nausea no vomiting.  No fevers no chills.    Review of Systems    As above  Objective   Objective   Vital Signs  Temp:  [97.5 °F (36.4 °C)-100.4 °F (38 °C)] 97.5 °F (36.4 °C)  Heart Rate:  [] 88  Resp:  [16-18] 18  BP: (105-135)/(56-76) 114/70  SpO2:  [94 %-98 %] 95 %  on   ;   Device (Oxygen Therapy): room air  Body mass index is 36.97 kg/m².  Physical Exam    General: Alert, laying in bed, not in distress,  HEENT: Normocephalic, atraumatic  CV: Regular rate and rhythm, no murmurs rubs or gallops  Lungs: Clear to auscultation bilaterally, no crackles or wheezes  Abdomen: Soft, + right flank TTP  Extremities: No significant peripheral edema , no cyanosis     Results Review     I reviewed the patient's new clinical results.  Results from last 7 days   Lab Units 12/15/24  0408 12/14/24  0433 12/13/24  2043   WBC 10*3/mm3 13.43* 11.57* 11.55*   HEMOGLOBIN g/dL 11.3* 12.9 14.3   PLATELETS 10*3/mm3 247 298 334     Results from last 7 days   Lab Units 12/15/24  0408 12/14/24  0433 12/13/24  2043   SODIUM mmol/L 139 140 135*   POTASSIUM mmol/L 3.9 4.1 4.0   CHLORIDE mmol/L 106 105 100   CO2 mmol/L 23.0 26.6 22.9   BUN mg/dL 8 8 7   CREATININE mg/dL 0.70 0.72 0.76   GLUCOSE mg/dL 100* 103* 92   Estimated Creatinine Clearance: 132.1 mL/min (by C-G formula based on SCr of 0.7 mg/dL).  Results from last 7 days   Lab Units 12/15/24  0408 12/13/24  2043   ALBUMIN g/dL 3.5 4.6   BILIRUBIN mg/dL 0.2 0.5   ALK PHOS U/L 63 88   AST (SGOT) U/L 16 26   ALT (SGPT) U/L 26 40*     Results from last 7 days   Lab Units 12/15/24  0408 24  0433 24  2043   CALCIUM mg/dL 8.3* 9.2 9.6   ALBUMIN g/dL 3.5  --  4.6   MAGNESIUM mg/dL 2.3  --   --    PHOSPHORUS mg/dL 3.2  --  "  --      Results from last 7 days   Lab Units 12/13/24 2043   LACTATE mmol/L 1.9   No results found for: \"COVID19\"  No results found for: \"HGBA1C\", \"POCGLU\"        CT Abdomen Pelvis Without Contrast  Narrative: Patient: VERN WOLFF  Time Out: 23:00  Exam(s): CT ABDOMEN + PELVIS Without Contrast     EXAM:    CT Abdomen and Pelvis Without Intravenous Contrast    CLINICAL HISTORY:     Reason for exam: flank pain.    TECHNIQUE:    Axial computed tomography images of the abdomen and pelvis without   intravenous contrast.  CTDI is 13.94 mGy and DLP is 740.9 mGy-cm.  This   CT exam was performed according to the principle of ALARA (As Low As   Reasonably Achievable) by using one or more of the following dose   reduction techniques: automated exposure control, adjustment of the mA   and or kV according to patient size, and or use of iterative   reconstruction technique.    COMPARISON:    No relevant prior studies available.    FINDINGS:    Lung bases:  Unremarkable.  No mass.  No consolidation.     ABDOMEN:    Liver:  Unremarkable.    Gallbladder and bile ducts:  Unremarkable.  No calcified stones.  No   ductal dilation.    Pancreas:  Unremarkable.  No ductal dilation.    Spleen:  Unremarkable.  No splenomegaly.    Adrenals:  Unremarkable.  No mass.    Kidneys and ureters:  Unremarkable.  No hydronephrosis, nephrolithiasis,   or obstructive uropathy.    Stomach and bowel:  Unremarkable.  No obstruction.  No mucosal   thickening.     PELVIS:    Appendix:  No findings to suggest acute appendicitis.    Bladder:  Unremarkable.  No stones.    Reproductive:  Unremarkable as visualized.     ABDOMEN and PELVIS:    Intraperitoneal space:  Unremarkable.  No free air.  No significant   fluid collection.    Bones joints:  No acute fracture.  No dislocation.    Soft tissues:  Unremarkable.    Vasculature:  Unremarkable.  No abdominal aortic aneurysm.    Lymph nodes:  Unremarkable.  No enlarged lymph nodes.    IMPRESSION: "         No hydronephrosis, nephrolithiasis, or obstructive uropathy.  Impression: Electronically signed by Clinton Edwards MD on 12-13-24 at 2300    Scheduled Medications  cefTRIAXone, 2,000 mg, Intravenous, Q24H  sodium chloride, 10 mL, Intravenous, Q12H  [START ON 12/16/2024] vitamin D, 50,000 Units, Oral, Weekly    Infusions   Diet  Diet: Regular/House; Fluid Consistency: Thin (IDDSI 0)    I have personally reviewed     [x]  Laboratory   [x]  Microbiology   [x]  Radiology   []  EKG/Telemetry  [x]  Cardiology/Vascular   []  Pathology    []  Records       Assessment/Plan     Active Hospital Problems    Diagnosis  POA    **Acute pyelonephritis [N10]  Unknown    Bacteremia due to Gram-negative bacteria [R78.81]  Unknown    Pyelonephritis [N12]  Yes    Abdominal pain [R10.9]  Yes      Resolved Hospital Problems   No resolved problems to display.       Patient is a 29 y.o. female with medical history significant for PCOS presents to the ED on 12/14/2024 with worsening dysuria that started 10 days ago and right-sided flank pain but started 4 days prior to admission.  Patient was initially admitted to observation unit in the ED,, initiated on IV fluids, IV ceftriaxone, urine and blood cultures were obtained.  Blood culture came back positive for E. coli, and A was consulted for assumption of care and further evaluation in the setting of positive blood cultures    Acute pyelonephritis  E. coli bacteremia  Sepsis  -Patient was tachycardic on admission, Tmax 101.1, meeting criteria for sepsis  -Urinalysis on admission was positive for nitrates, large leukocytes, RBC, WBC, 1+ bacteria.  -CT abdomen pelvis on admission with no hydronephrosis, nephrolithiasis, or obstructive uropathy, however was performed without contrast.  Clinically symptoms consistent with pyelonephritis.  Patient with tenderness to  palpation in the right flank.  -Admission blood cultures positive for E. coli, sensitivity pending  -Repeat blood cultures  12/15/2024 pending  -As needed Percocet  -Fever curve improving, WBC slightly higher today  -ID consult pending  -Continue IV ceftriaxone      SCDs for DVT prophylaxis.  Full code.  Discussed with patient.  Expected Discharge Date: 12/16/2024; Expected Discharge Time:        Copied text in this note has been reviewed and is accurate as of 12/15/24.         Dictated utilizing Dragon dictation        Isaías Griffin MD  [facility]  12/15/24  13:25 EST

## 2024-12-15 NOTE — CONSULTS
CONSULT NOTE    Infectious Diseases - Pepe Josue MD  [facility]       Patient Identification:  Name: Nkechi Saavedra  Age: 29 y.o.  Sex: female  :  1995  MRN: [id number]             Date of Consultation: 12/15/2024      Primary Care Physician: Michelel Montes APRN                               Requesting Physician:   Reason for Consultation: Bacteremia    History of presenting illness: Patient is a 29-year-old female with history of recurrent urinary tract infections and has had multiple UTIs as a child and was doing fine until last year when she had 4 episodes of urinary tract infection on a monthly basis from September through December was in her usual state of her health until 10 days prior to this admission when she started having burning in urination for 10 days and started to have right-sided flank pain 4 days prior to coming to the emergency room on 2024.  Patient was diagnosed with pyelonephritis and UTI and was started on IV antibiotic therapy after blood cultures were drawn and admitted to obs unit.  Her blood culture come back positive for gram-negative theresa resulting in admission to be inpatient admission.  Over the course of last 48 hours her right-sided flank pain is much improved but is still there and her dysuria is much improved.  Infectious disease service is consulted to comment on antibiotic therapy for this bacteremic pyelonephritis.  Patient has underlying history of polycystic ovarian syndrome hypothyroidism's disease.  Impression:  This presentation in the above context is consistent with:  1-E. coli bacteremic pyelonephritis  2-history of recurrent UTI suggestive of structural or functional  tract abnormalities though not clearly reported on the CT scan of the abdomen and pelvis performed at the time of admission on 2024  3-history of PCOS  4-other diagnoses per primary team.  Recommendations/Discussions:  At this juncture I  agree with the care plan consisting of IV Rocephin at 2 g every 24 and repeating blood cultures.  Follow-up on the clinical course and sensitivity of the E. coli to decide upon antibiotic therapy.  Patient's allergy profile is such that options for out of hospital antibiotic therapy are either:  Continuation of IV Rocephin for total of 10 to 14 days versus  If pathogen is sensitive to Bactrim then Bactrim DS 1 tablet p.o. twice daily for 10 to 14 days with close monitoring of renal function, potassium level and keeping an eye on rash and fever that could occur from the Bactrim use with frequent point BMPs while on Bactrim, or  If pathogen is sensitive to quinolone class of antibiotics and Cipro Levaquin will be another option as oral antibiotic therapy if patient is agreeable to take quinolone after risks clearly knowing potential FDA warnings such as possibility of ligament tendon injury, neuropathy, QTc prolongation potential, neuropsychiatric symptoms etc.  Also patient is informed of the alternate choices to quinolones.  Patient would also require out of hospital follow-up with urology service to make sure that she does not have vesicoureteric reflux disease putting her at high risk of recurrent urinary tract infection and strategies to prevent future episodes of UTI.  Side effects of antibiotic therapy in general discussed with the patient.  Thank you very much for letting me be the part of your patient care please see above impression and recommendations              Past Medical History:  Past Medical History:   Diagnosis Date    Abnormal ultrasound of thyroid gland 02/22/2023    Fatty liver     Frequent UTI     History of peritonitis     childhood    Polycystic ovary syndrome     Polyp, corpus uteri     Thyroid disorder     Thyroid nodule 02/22/2023     Past Surgical History:  Past Surgical History:   Procedure Laterality Date    APPENDECTOMY      age 4    D & C HYSTEROSCOPY N/A 01/23/2023    Procedure:  DILATATION AND CURETTAGE HYSTEROSCOPY WITH MYOSURE;  Surgeon: Fabricio Barrera MD;  Location: [facility] MAIN OR;  Service: Obstetrics/Gynecology;  Laterality: N/A;    DIAGNOSTIC LAPAROSCOPY      for peritonitis in childhood      Home Meds:  Medications Prior to Admission   Medication Sig Dispense Refill Last Dose/Taking    naproxen (NAPROSYN) 500 MG tablet Take 1 tablet by mouth 2 (Two) Times a Day As Needed for Mild Pain. 30 tablet 0 12/13/2024    SUMAtriptan (Imitrex) 100 MG tablet Take one tablet at onset of headache. May repeat dose one time in 2 hours if headache not relieved. 6 tablet 5 Taking    vitamin D (ERGOCALCIFEROL) 1.25 MG (42407 UT) capsule capsule Take 1 capsule by mouth 1 (One) Time Per Week. 12 capsule 0 Taking     Current Meds:     Current Facility-Administered Medications:     acetaminophen (TYLENOL) tablet 650 mg, 650 mg, Oral, Q6H PRN, Adolfo Singh APRN, 650 mg at 12/14/24 2346    sennosides-docusate (PERICOLACE) 8.6-50 MG per tablet 2 tablet, 2 tablet, Oral, BID PRN **AND** polyethylene glycol (MIRALAX) packet 17 g, 17 g, Oral, Daily PRN **AND** bisacodyl (DULCOLAX) EC tablet 5 mg, 5 mg, Oral, Daily PRN **AND** bisacodyl (DULCOLAX) suppository 10 mg, 10 mg, Rectal, Daily PRN, Adolfo Singh APRN    Calcium Replacement - Follow Nurse / BPA Driven Protocol, , Not Applicable, PRN, Adolfo Singh APRN    cefTRIAXone (ROCEPHIN) 2,000 mg in sodium chloride 0.9 % 100 mL MBP, 2,000 mg, Intravenous, Q24H, Adolfo Singh APRN, Stopped at 12/14/24 1742    Magnesium Standard Dose Replacement - Follow Nurse / BPA Driven Protocol, , Not Applicable, PRN, Adolfo Singh, APRN    ondansetron (ZOFRAN) injection 4 mg, 4 mg, Intravenous, Q6H PRN, Adolfo Singh APRN, 4 mg at 12/14/24 2347    oxyCODONE-acetaminophen (PERCOCET) 5-325 MG per tablet 1 tablet, 1 tablet, Oral, Q6H PRN, Isaías Griffin MD    phenazopyridine (PYRIDIUM) tablet 100 mg, 100 mg, Oral, TID PRN, Adolfo Singh APRN    Phosphorus Replacement  - Follow Nurse / BPA Driven Protocol, , Not Applicable, PRN, Adolfo Singh, APRN    Potassium Replacement - Follow Nurse / BPA Driven Protocol, , Not Applicable, PRN, Adolfo Singh, MIAH    sodium chloride 0.9 % flush 10 mL, 10 mL, Intravenous, PRN, Adolfo Singh, APRN    sodium chloride 0.9 % flush 10 mL, 10 mL, Intravenous, Q12H, Adolfo Singh, APRN, 10 mL at 12/14/24 2218    sodium chloride 0.9 % flush 10 mL, 10 mL, Intravenous, PRN, Adolfo Singh, APRN    sodium chloride 0.9 % flush 10 mL, 10 mL, Intravenous, PRN, Anshu Barton MD    sodium chloride 0.9 % infusion 40 mL, 40 mL, Intravenous, PRN, Adolfo Singh APRN    [START ON 12/16/2024] vitamin D (ERGOCALCIFEROL) capsule 50,000 Units, 50,000 Units, Oral, Weekly, Adolfo Singh APRN  Allergies:  Allergies   Allergen Reactions    Provera [Medroxyprogesterone] Other (See Comments)     Suicidal Thoughts./ depression    Metformin GI Intolerance     Social History:   Social History     Tobacco Use    Smoking status: Never     Passive exposure: Never    Smokeless tobacco: Never   Substance Use Topics    Alcohol use: Not Currently      Family History:  Family History   Problem Relation Age of Onset    Diabetes Sister     Malig Hyperthermia Neg Hx     Breast cancer Neg Hx     Ovarian cancer Neg Hx     Uterine cancer Neg Hx     Colon cancer Neg Hx           Review of Systems  See history of present illness and past medical history.  Patient denies headache, dizziness, syncope, falls, trauma, change in vision, change in hearing, change in taste, changes in weight, changes in appetite, focal weakness, numbness, or paresthesia.  Patient denies chest pain, palpitations, dyspnea, orthopnea, PND, cough, sinus pressure, rhinorrhea, epistaxis, hemoptysis, nausea, vomiting,hematemesis, diarrhea, constipation or hematchezia.  Denies cold or heat intolerance, polydipsia, polyuria, polyphagia. Denies hematuria, pyuria, dysuria, hesitancy, frequency or urgency. Denies consumption of raw and  "under cooked meats foods or change in water source.  Denies fever, chills, sweats, night sweats.  Denies missing any routine medications. Remainder of ROS is negative.      Vitals:   /56 (BP Location: Left arm, Patient Position: Lying)   Pulse 104   Temp 99 °F (37.2 °C) (Oral)   Resp 18   Ht 160 cm (63\")   Wt 94.7 kg (208 lb 11.2 oz)   LMP 07/01/2024   SpO2 96%   BMI 36.97 kg/m²   I/O:   Intake/Output Summary (Last 24 hours) at 12/15/2024 0728  Last data filed at 12/14/2024 2018  Gross per 24 hour   Intake 520 ml   Output --   Net 520 ml     Exam:  Patient is examined using the personal protective equipment as per guidelines from infection control for this particular patient as enacted.  Hand washing was performed before and after patient interaction.  General Appearance:    Alert, cooperative, no distress, appears stated age   Head:    Normocephalic, without obvious abnormality, atraumatic   Eyes:    PERRL, conjunctivae/corneas clear, EOM's intact, both eyes   Ears:    Normal external ear canals, both ears   Nose:   Nares normal, septum midline, mucosa normal, no drainage    or sinus tenderness   Throat:   Lips, tongue, gums normal; oral mucosa pink and moist   Neck:   Supple, symmetrical, trachea midline, no adenopathy;     thyroid:  no enlargement/tenderness/nodules; no carotid    bruit or JVD   Back:     Symmetric, no curvature, ROM normal, no CVA tenderness   Lungs:     Clear to auscultation bilaterally, respirations unlabored   Chest Wall:    No tenderness or deformity    Heart:    Regular rate and rhythm, S1 and S2 normal, no murmur,    Abdomen:     Soft, non-tender, bowel sounds active all four quadrants,     no masses, no hepatomegaly, no splenomegaly   Extremities:   Extremities normal, atraumatic, no cyanosis or edema   Pulses:   Pulses palpable in all extremities; symmetric all extremities   Skin:   Skin color normal, Skin is warm and dry,  no rashes or palpable lesions   Neurologic:   " CNII-XII intact, motor strength grossly intact, sensation grossly intact to light touch, no focal deficits noted       Data Review:    I reviewed the patient's new clinical results.  Results from last 7 days   Lab Units 12/15/24  0408 12/14/24 0433 12/13/24  2043   WBC 10*3/mm3 13.43* 11.57* 11.55*   HEMOGLOBIN g/dL 11.3* 12.9 14.3   PLATELETS 10*3/mm3 247 298 334     Results from last 7 days   Lab Units 12/15/24  0408 12/14/24  0433 12/13/24  2043   SODIUM mmol/L 139 140 135*   POTASSIUM mmol/L 3.9 4.1 4.0   CHLORIDE mmol/L 106 105 100   CO2 mmol/L 23.0 26.6 22.9   BUN mg/dL 8 8 7   CREATININE mg/dL 0.70 0.72 0.76   CALCIUM mg/dL 8.3* 9.2 9.6   GLUCOSE mg/dL 100* 103* 92     Microbiology Results (last 10 days)       Procedure Component Value - Date/Time    Gastrointestinal Panel, PCR - Stool, Per Rectum [[id number]]  (Normal) Collected: 12/14/24 1032    Lab Status: Final result Specimen: Stool from Per Rectum Updated: 12/14/24 1237     Campylobacter Not Detected     Plesiomonas shigelloides Not Detected     Salmonella Not Detected     Vibrio Not Detected     Vibrio cholerae Not Detected     Yersinia enterocolitica Not Detected     Enteroaggregative E. coli (EAEC) Not Detected     Enteropathogenic E. coli (EPEC) Not Detected     Enterotoxigenic E. coli (ETEC) lt/st Not Detected     Shiga-like toxin-producing E. coli (STEC) stx1/stx2 Not Detected     Shigella/Enteroinvasive E. coli (EIEC) Not Detected     Cryptosporidium Not Detected     Cyclospora cayetanensis Not Detected     Entamoeba histolytica Not Detected     Giardia lamblia Not Detected     Adenovirus F40/41 Not Detected     Astrovirus Not Detected     Norovirus GI/GII Not Detected     Rotavirus A Not Detected     Sapovirus (I, II, IV or V) Not Detected    Narrative:      If Aeromonas, Staphylococcus aureus or Bacillus cereus are suspected, please order [id number]: Stool Culture, Aeromonas, S aureus, B Cereus.    Blood Culture - Blood, Arm, Left [[id number]]   (Abnormal) Collected: 12/13/24 2138    Lab Status: Preliminary result Specimen: Blood from Arm, Left Updated: 12/15/24 0657     Blood Culture Escherichia coli     Isolated from Aerobic Bottle     Gram Stain Aerobic Bottle Gram negative bacilli    Narrative:      Less than seven (7) mL's of blood was collected.  Insufficient quantity may yield false negative results.    Blood Culture ID, PCR - Blood, Arm, Left [[id number]]  (Abnormal) Collected: 12/13/24 2138    Lab Status: Final result Specimen: Blood from Arm, Left Updated: 12/14/24 1155     BCID, PCR Escherichia coli. Identification by BCID2 PCR.     BOTTLE TYPE Aerobic Bottle    Narrative:      No resistance genes detected.    Blood Culture - Blood, Arm, Left [[id number]]  (Normal) Collected: 12/13/24 2043    Lab Status: Preliminary result Specimen: Blood from Arm, Left Updated: 12/14/24 2102     Blood Culture No growth at 24 hours              Assessment:  Active Hospital Problems    Diagnosis  POA    **Acute pyelonephritis [N10]  Unknown    Bacteremia due to Gram-negative bacteria [R78.81]  Unknown    Pyelonephritis [N12]  Yes    Abdominal pain [R10.9]  Yes      Resolved Hospital Problems   No resolved problems to display.         Plan:  See above  Pepe Tena MD   12/15/2024  07:28 EST    Parts of this note may be an electronic transcription/translation of spoken language to printed text using the Dragon dictation system.

## 2024-12-16 ENCOUNTER — READMISSION MANAGEMENT (OUTPATIENT)
Dept: CALL CENTER | Facility: HOSPITAL | Age: 29
End: 2024-12-16
Payer: MEDICAID

## 2024-12-16 VITALS
BODY MASS INDEX: 36.98 KG/M2 | OXYGEN SATURATION: 99 % | SYSTOLIC BLOOD PRESSURE: 123 MMHG | HEIGHT: 63 IN | RESPIRATION RATE: 18 BRPM | HEART RATE: 88 BPM | TEMPERATURE: 97.9 F | WEIGHT: 208.7 LBS | DIASTOLIC BLOOD PRESSURE: 78 MMHG

## 2024-12-16 PROBLEM — R78.81 E COLI BACTEREMIA: Status: ACTIVE | Noted: 2024-12-16

## 2024-12-16 PROBLEM — B96.20 E COLI BACTEREMIA: Status: ACTIVE | Noted: 2024-12-16

## 2024-12-16 LAB
ALBUMIN SERPL-MCNC: 3.6 G/DL (ref 3.5–5.2)
ALP SERPL-CCNC: 65 U/L (ref 39–117)
ALT SERPL W P-5'-P-CCNC: 37 U/L (ref 1–33)
ANION GAP SERPL CALCULATED.3IONS-SCNC: 8.8 MMOL/L (ref 5–15)
AST SERPL-CCNC: 30 U/L (ref 1–32)
BACTERIA SPEC AEROBE CULT: ABNORMAL
BACTERIA SPEC AEROBE CULT: ABNORMAL
BILIRUB CONJ SERPL-MCNC: <0.2 MG/DL (ref 0–0.3)
BILIRUB INDIRECT SERPL-MCNC: ABNORMAL MG/DL
BILIRUB SERPL-MCNC: <0.2 MG/DL (ref 0–1.2)
BUN SERPL-MCNC: 8 MG/DL (ref 6–20)
BUN/CREAT SERPL: 10.7 (ref 7–25)
CALCIUM SPEC-SCNC: 8.8 MG/DL (ref 8.6–10.5)
CHLORIDE SERPL-SCNC: 102 MMOL/L (ref 98–107)
CO2 SERPL-SCNC: 24.2 MMOL/L (ref 22–29)
CREAT SERPL-MCNC: 0.75 MG/DL (ref 0.57–1)
DEPRECATED RDW RBC AUTO: 40.5 FL (ref 37–54)
EGFRCR SERPLBLD CKD-EPI 2021: 112.1 ML/MIN/1.73
ERYTHROCYTE [DISTWIDTH] IN BLOOD BY AUTOMATED COUNT: 14 % (ref 12.3–15.4)
GLUCOSE SERPL-MCNC: 95 MG/DL (ref 65–99)
GRAM STN SPEC: ABNORMAL
HCT VFR BLD AUTO: 37.8 % (ref 34–46.6)
HGB BLD-MCNC: 11.3 G/DL (ref 12–15.9)
ISOLATED FROM: ABNORMAL
MCH RBC QN AUTO: 23.8 PG (ref 26.6–33)
MCHC RBC AUTO-ENTMCNC: 29.9 G/DL (ref 31.5–35.7)
MCV RBC AUTO: 79.6 FL (ref 79–97)
PLATELET # BLD AUTO: 255 10*3/MM3 (ref 140–450)
PMV BLD AUTO: 10.4 FL (ref 6–12)
POTASSIUM SERPL-SCNC: 4.3 MMOL/L (ref 3.5–5.2)
PROT SERPL-MCNC: 6.9 G/DL (ref 6–8.5)
RBC # BLD AUTO: 4.75 10*6/MM3 (ref 3.77–5.28)
SODIUM SERPL-SCNC: 135 MMOL/L (ref 136–145)
WBC NRBC COR # BLD AUTO: 10.47 10*3/MM3 (ref 3.4–10.8)

## 2024-12-16 PROCEDURE — 85027 COMPLETE CBC AUTOMATED: CPT | Performed by: STUDENT IN AN ORGANIZED HEALTH CARE EDUCATION/TRAINING PROGRAM

## 2024-12-16 PROCEDURE — 80048 BASIC METABOLIC PNL TOTAL CA: CPT | Performed by: STUDENT IN AN ORGANIZED HEALTH CARE EDUCATION/TRAINING PROGRAM

## 2024-12-16 PROCEDURE — 80076 HEPATIC FUNCTION PANEL: CPT | Performed by: STUDENT IN AN ORGANIZED HEALTH CARE EDUCATION/TRAINING PROGRAM

## 2024-12-16 RX ORDER — SULFAMETHOXAZOLE AND TRIMETHOPRIM 800; 160 MG/1; MG/1
1 TABLET ORAL EVERY 12 HOURS SCHEDULED
Status: DISCONTINUED | OUTPATIENT
Start: 2024-12-16 | End: 2024-12-16 | Stop reason: HOSPADM

## 2024-12-16 RX ORDER — SULFAMETHOXAZOLE AND TRIMETHOPRIM 800; 160 MG/1; MG/1
1 TABLET ORAL EVERY 12 HOURS SCHEDULED
Qty: 19 TABLET | Refills: 0 | Status: SHIPPED | OUTPATIENT
Start: 2024-12-16 | End: 2024-12-26

## 2024-12-16 RX ORDER — ACETAMINOPHEN 325 MG/1
650 TABLET ORAL EVERY 6 HOURS PRN
Start: 2024-12-16

## 2024-12-16 RX ADMIN — SULFAMETHOXAZOLE AND TRIMETHOPRIM 1 TABLET: 800; 160 TABLET ORAL at 10:17

## 2024-12-16 RX ADMIN — ERGOCALCIFEROL 50000 UNITS: 1.25 CAPSULE ORAL at 08:26

## 2024-12-16 RX ADMIN — Medication 10 ML: at 10:22

## 2024-12-16 NOTE — DISCHARGE INSTRUCTIONS
Notify your primary care provider if you experience chest pain, difficulty breathing, fevers/chills, nausea or vomiting, bleeding in stool, excessive diarrhea, numbness or weakness or tingling in any part of your body.      Do not take ibuprofen or other nonsteroidal anti-inflammatory drugs such as Advil, Aleve, naproxen while taking Bactrim due to high risk of kidney injury.    Follow-up with primary care in 5 days for lab draw to monitor renal function while on Bactrim.    Will need outpatient follow-up with urology for evaluation of recurrent pyelonephritis.  I placed referral to urology at discharge.  However

## 2024-12-16 NOTE — PLAN OF CARE
Goal Outcome Evaluation:  Plan of Care Reviewed With: patient        Progress: improving  Outcome Evaluation: vss. pt still with mild flank pain. ID consulted. awaiting repeat blood cultures. poss alexander

## 2024-12-16 NOTE — DISCHARGE SUMMARY
Patient Name: Nkechi Saavedra  : 1995  MRN: [id number]    Date of Admission: 2024  Date of Discharge:  2024  Primary Care Physician: Michelle Montes APRN      Chief Complaint:   Abdominal Pain      Discharge Diagnoses     Active Hospital Problems    Diagnosis  POA    **Acute pyelonephritis [N10]  Unknown    E coli bacteremia [R78.81, B96.20]  Yes    Bacteremia due to Gram-negative bacteria [R78.81]  Unknown    Pyelonephritis [N12]  Yes    Abdominal pain [R10.9]  Yes      Resolved Hospital Problems   No resolved problems to display.        Hospital Course     Patient is a 29 y.o. female with medical history significant for PCOS presents, prior pyelonephritis, presented to the ED on 2024 with worsening dysuria that started 10 days ago and right-sided flank pain but started 4 days prior to admission.  Patient was initially admitted to observation unit in the ED,, initiated on IV fluids, IV ceftriaxone, urine and blood cultures were obtained.  Blood culture came back positive for E. coli, and A was consulted for assumption of care and further evaluation in the setting of positive blood cultures.  IV ceftriaxone and pain management was continued, ID was consulted, and repeat blood cultures were obtained.  Patient clinically improved, WBC peaked at 13.43, and normalized prior to discharge.  Repeat blood culture from 12/15/2024 remained negative to date.  Fever resolved, still had mild pain but significantly improved compared to admission.  Patient was discharged on Bactrim for 10 more days per infectious disease recommendations.  In the setting of recurrent pyelonephritis, concerning for vesicoureteral reflux and patient needs evaluation by urology per infectious disease recommendations.  Discussed with patient.  Placed referral to urology at discharge and information to call as well.        Discussed with patient  Discussed with ID    At the time of discharge patient was  told to take all medications as prescribed, keep all follow-up appointments, and call their doctor or return to the hospital with any worsening or concerning symptoms.              Day of Discharge     Subjective:  Patient seen this morning, no acute events overnight.  Reports pain is improved, mild, 2 out of 10.  No nausea no vomiting, no fevers no chills.    Physical Exam:  Temp:  [97.5 °F (36.4 °C)-98.7 °F (37.1 °C)] 97.9 °F (36.6 °C)  Heart Rate:  [83-90] 88  Resp:  [16-18] 18  BP: (113-123)/(70-86) 123/78  Body mass index is 36.97 kg/m².  Physical Exam    General: Alert, laying in bed, not in distress,  HEENT: Normocephalic, atraumatic  CV: Regular rate and rhythm, no murmurs rubs or gallops  Lungs: Clear to auscultation bilaterally, no crackles or wheezes  Abdomen: Soft, mild TTP right flank, nondistended  Extremities: No significant peripheral edema , no cyanosis     Consultants     Consult Orders (all) (From admission, onward)       Start     Ordered    12/14/24 1120  Inpatient Infectious Diseases Consult  Once        Specialty:  Infectious Diseases  Provider:  Pepe Tena MD    12/14/24 1119    12/14/24 1004  Inpatient Hospitalist Consult  Once        Specialty:  Hospitalist  Provider:  Hakan Mora MD    12/14/24 1004                  Procedures     * Surgery not found *      Imaging Results (All)       Procedure Component Value Units Date/Time    CT Abdomen Pelvis Without Contrast [[id number]] Collected: 12/13/24 2301     Updated: 12/13/24 2301    Narrative:        Patient: VERN WOLFF  Time Out: 23:00  Exam(s): CT ABDOMEN + PELVIS Without Contrast     EXAM:    CT Abdomen and Pelvis Without Intravenous Contrast    CLINICAL HISTORY:     Reason for exam: flank pain.    TECHNIQUE:    Axial computed tomography images of the abdomen and pelvis without   intravenous contrast.  CTDI is 13.94 mGy and DLP is 740.9 mGy-cm.  This   CT exam was performed according to the principle of ALARA (As Low As    Reasonably Achievable) by using one or more of the following dose   reduction techniques: automated exposure control, adjustment of the mA   and or kV according to patient size, and or use of iterative   reconstruction technique.    COMPARISON:    No relevant prior studies available.    FINDINGS:    Lung bases:  Unremarkable.  No mass.  No consolidation.     ABDOMEN:    Liver:  Unremarkable.    Gallbladder and bile ducts:  Unremarkable.  No calcified stones.  No   ductal dilation.    Pancreas:  Unremarkable.  No ductal dilation.    Spleen:  Unremarkable.  No splenomegaly.    Adrenals:  Unremarkable.  No mass.    Kidneys and ureters:  Unremarkable.  No hydronephrosis, nephrolithiasis,   or obstructive uropathy.    Stomach and bowel:  Unremarkable.  No obstruction.  No mucosal   thickening.     PELVIS:    Appendix:  No findings to suggest acute appendicitis.    Bladder:  Unremarkable.  No stones.    Reproductive:  Unremarkable as visualized.     ABDOMEN and PELVIS:    Intraperitoneal space:  Unremarkable.  No free air.  No significant   fluid collection.    Bones joints:  No acute fracture.  No dislocation.    Soft tissues:  Unremarkable.    Vasculature:  Unremarkable.  No abdominal aortic aneurysm.    Lymph nodes:  Unremarkable.  No enlarged lymph nodes.    IMPRESSION:         No hydronephrosis, nephrolithiasis, or obstructive uropathy.      Impression:          Electronically signed by Clinton Edwards MD on 12-13-24 at 2300              Pertinent Labs     Results from last 7 days   Lab Units 12/16/24  0531 12/15/24  0408 12/14/24  0433 12/13/24  2043   WBC 10*3/mm3 10.47 13.43* 11.57* 11.55*   HEMOGLOBIN g/dL 11.3* 11.3* 12.9 14.3   PLATELETS 10*3/mm3 255 247 298 334     Results from last 7 days   Lab Units 12/16/24  0531 12/15/24  0408 12/14/24  0433 12/13/24  2043   SODIUM mmol/L 135* 139 140 135*   POTASSIUM mmol/L 4.3 3.9 4.1 4.0   CHLORIDE mmol/L 102 106 105 100   CO2 mmol/L 24.2 23.0 26.6 22.9   BUN mg/dL 8 8 8  "7   CREATININE mg/dL 0.75 0.70 0.72 0.76   GLUCOSE mg/dL 95 100* 103* 92   Estimated Creatinine Clearance: 123.3 mL/min (by C-G formula based on SCr of 0.75 mg/dL).  Results from last 7 days   Lab Units 12/16/24  0531 12/15/24  0408 12/13/24  2043   ALBUMIN g/dL 3.6 3.5 4.6   BILIRUBIN mg/dL <0.2 0.2 0.5   ALK PHOS U/L 65 63 88   AST (SGOT) U/L 30 16 26   ALT (SGPT) U/L 37* 26 40*     Results from last 7 days   Lab Units 12/16/24  0531 12/15/24  0408 12/14/24  0433 12/13/24  2043   CALCIUM mg/dL 8.8 8.3* 9.2 9.6   ALBUMIN g/dL 3.6 3.5  --  4.6   MAGNESIUM mg/dL  --  2.3  --   --    PHOSPHORUS mg/dL  --  3.2  --   --                Invalid input(s): \"LDLCALC\"  Results from last 7 days   Lab Units 12/15/24  0759 12/13/24  2138 12/13/24 2121 12/13/24  2043   BLOODCX  No growth at 24 hours Escherichia coli*  --  No growth at 2 days   URINECX   --   --  >100,000 CFU/mL Escherichia coli*  --    BCIDPCR   --  Escherichia coli. Identification by BCID2 PCR.*  --   --          Test Results Pending at Discharge     Pending Labs       Order Current Status    Blood Culture - Blood, Arm, Right In process    Blood Culture - Blood, Arm, Left Preliminary result    Blood Culture - Blood, Hand, Left Preliminary result    Urine Culture - Urine, Urine, Clean Catch Preliminary result            Discharge Details        Discharge Medications        New Medications        Instructions Start Date   acetaminophen 325 MG tablet  Commonly known as: TYLENOL   650 mg, Oral, Every 6 Hours PRN      sulfamethoxazole-trimethoprim 800-160 MG per tablet  Commonly known as: BACTRIM DS,SEPTRA DS   1 tablet, Oral, Every 12 Hours Scheduled             Continue These Medications        Instructions Start Date   SUMAtriptan 100 MG tablet  Commonly known as: Imitrex   Take one tablet at onset of headache. May repeat dose one time in 2 hours if headache not relieved.      vitamin D 1.25 MG (24261 UT) capsule capsule  Commonly known as: ERGOCALCIFEROL   " 50,000 Units, Oral, Weekly             Stop These Medications      naproxen 500 MG tablet  Commonly known as: NAPROSYN              Allergies   Allergen Reactions    Provera [Medroxyprogesterone] Other (See Comments)     Suicidal Thoughts./ depression    Metformin GI Intolerance       Discharge Disposition:  Home or Self Care      Discharge Diet:  Diet Order   Procedures    Diet: Regular/House; Fluid Consistency: Thin (IDDSI 0)       Discharge Activity:       CODE STATUS:    Code Status and Medical Interventions: CPR (Attempt to Resuscitate); Full Support   Ordered at: 12/13/24 2282     Level Of Support Discussed With:    Patient     Code Status (Patient has no pulse and is not breathing):    CPR (Attempt to Resuscitate)     Medical Interventions (Patient has pulse or is breathing):    Full Support       Future Appointments   Date Time Provider Department Center   7/22/2025 10:00 AM Fabricio Barrera MD [facility]      Follow-up Information       Michelle Montes APRN. Schedule an appointment as soon as possible for a visit in 5 day(s).    Specialty: Nurse Practitioner  Contact information:  [address] [id number] [phone]               [facility]. Schedule an appointment as soon as possible for a visit.    Contact information:  [address] [facility] [id number] [phone]                           Time Spent on Discharge:  Greater than 30 minutes      Isaías Griffin MD  [facility]  12/16/24  09:04 EST

## 2024-12-16 NOTE — PLAN OF CARE
Goal Outcome Evaluation:         Pt VSS, No complaint of Nausea or vomitting. Pain is much better.

## 2024-12-16 NOTE — PROGRESS NOTES
"Enter Query Response Below      Query Response: Sepsis ruled in              If applicable, please update the problem list.   Patient: Nkechi Hamilton        : 1995  Account: [id number]           Admit Date:         How to Respond to this query:       a. Click New Note     b. Answer query within the yellow box.                c. Update the Problem List, if applicable.      If you have any questions about this query contact me at: [email or web address]     Dr. Griffin,     29-year-old female admitted 2024 (OBS), 2024 (IP) with UTI per H&P.  -15 Hospitalist Progress Notes report \"Sepsis -Patient was tachycardic on admission, Tmax 101.1, meeting criteria for sepsis\".  Discharge Summary notes \"Acute pyelonephritis\" and \"E coli bacteremia\".   WBC: 11.55 (), 11.57 (), 13.43 (12/15).  Heart rate: 125 (2020), 116 (3), 112 ( 2203), 123 ( 0725), 112 (2018).  Temperature: 101 F (2020), 101.1 F ( 0725), 100.4 F (2018).  Treatment included: Infectious Disease Consult, 1L IV Normal Saline Bolus (2142), IV Rocephin (12/13-15), Pyridium, Bactrim DS, and 2,079mL Sepsis Fluid LR Bolus ( 1026). Per Discharge Summary, \"Patient was discharged on Bactrim for 10 more days per infectious disease recommendations.\"    Please clarify the following:    Sepsis ruled in  Sepsis ruled out  Other (please specify) ______  Unable to determine    By submitting this query, we are merely seeking further clarification of documentation to accurately reflect all conditions that you are monitoring, evaluating, treating or that extend the hospitalization or utilize additional resources of care. Please utilize your independent clinical judgment when addressing the question(s) above.     This query and your response, once completed, will be entered into the legal medical record.    Sincerely,  Hung Byrnes, MARY ANNN, RN   Clinical Documentation " [facility]

## 2024-12-16 NOTE — PROGRESS NOTES
"  Infectious Diseases Progress Note    Pepe Tena MD     [facility]  [place]: 2 days  Patient Identification:  Name: Nkechi Saavedra  Age: 29 y.o.  Sex: female  :  1995  MRN: [id number]         Primary Care Physician: Michelle Montes APRN        Subjective: Feeling much better with decreased pain on the right side.  Denies any fever and chills.  Interval History: See consultation note.    Objective:    Scheduled Meds:cefTRIAXone, 2,000 mg, Intravenous, Q24H  sodium chloride, 10 mL, Intravenous, Q12H  vitamin D, 50,000 Units, Oral, Weekly      Continuous Infusions:     Vital signs in last 24 hours:  Temp:  [97.5 °F (36.4 °C)-98.7 °F (37.1 °C)] 97.9 °F (36.6 °C)  Heart Rate:  [] 88  Resp:  [16-18] 18  BP: (113-123)/(70-86) 123/78    Intake/Output:    Intake/Output Summary (Last 24 hours) at 2024 0849  Last data filed at 2024 0020  Gross per 24 hour   Intake 720 ml   Output --   Net 720 ml       Exam:  /78   Pulse 88   Temp 97.9 °F (36.6 °C)   Resp 18   Ht 160 cm (63\")   Wt 94.7 kg (208 lb 11.2 oz)   LMP 2024   SpO2 99%   BMI 36.97 kg/m²   Patient is examined using the personal protective equipment as per guidelines from infection control for this particular patient as enacted.  Hand washing was performed before and after patient interaction.  General Appearance:    Alert, cooperative, no distress, AAOx3                          Head:    Normocephalic, without obvious abnormality, atraumatic                           Eyes:    PERRL, conjunctivae/corneas clear, EOM's intact, both eyes                         Throat:   Lips, tongue, gums normal; oral mucosa pink and moist                           Neck:   Supple, symmetrical, trachea midline, no JVD                         Lungs:    Clear to auscultation bilaterally, respirations unlabored                 Chest Wall:    No tenderness or deformity                          Heart:  S1-S2 regular       "            Abdomen:   Almost resolved right CVA tenderness.                 Extremities:   Extremities normal, atraumatic, no cyanosis or edema                        Pulses:   Pulses palpable in all extremities                            Skin:   Skin is warm and dry,  no rashes or palpable lesions                  Neurologic: Alert and oriented x 3       Data Review:    I reviewed the patient's new clinical results.  Results from last 7 days   Lab Units 12/16/24  0531 12/15/24  0408 12/14/24 0433 12/13/24  2043   WBC 10*3/mm3 10.47 13.43* 11.57* 11.55*   HEMOGLOBIN g/dL 11.3* 11.3* 12.9 14.3   PLATELETS 10*3/mm3 255 247 298 334     Results from last 7 days   Lab Units 12/16/24  0531 12/15/24  0408 12/14/24 0433 12/13/24  2043   SODIUM mmol/L 135* 139 140 135*   POTASSIUM mmol/L 4.3 3.9 4.1 4.0   CHLORIDE mmol/L 102 106 105 100   CO2 mmol/L 24.2 23.0 26.6 22.9   BUN mg/dL 8 8 8 7   CREATININE mg/dL 0.75 0.70 0.72 0.76   CALCIUM mg/dL 8.8 8.3* 9.2 9.6   GLUCOSE mg/dL 95 100* 103* 92     Microbiology Results (last 10 days)       Procedure Component Value - Date/Time    Blood Culture - Blood, Hand, Left [[id number]]  (Normal) Collected: 12/15/24 0759    Lab Status: Preliminary result Specimen: Blood from Hand, Left Updated: 12/16/24 0830     Blood Culture No growth at 24 hours    Narrative:      Less than seven (7) mL's of blood was collected.  Insufficient quantity may yield false negative results.    Gastrointestinal Panel, PCR - Stool, Per Rectum [[id number]]  (Normal) Collected: 12/14/24 1032    Lab Status: Final result Specimen: Stool from Per Rectum Updated: 12/14/24 1237     Campylobacter Not Detected     Plesiomonas shigelloides Not Detected     Salmonella Not Detected     Vibrio Not Detected     Vibrio cholerae Not Detected     Yersinia enterocolitica Not Detected     Enteroaggregative E. coli (EAEC) Not Detected     Enteropathogenic E. coli (EPEC) Not Detected     Enterotoxigenic E. coli (ETEC) lt/st Not  Detected     Shiga-like toxin-producing E. coli (STEC) stx1/stx2 Not Detected     Shigella/Enteroinvasive E. coli (EIEC) Not Detected     Cryptosporidium Not Detected     Cyclospora cayetanensis Not Detected     Entamoeba histolytica Not Detected     Giardia lamblia Not Detected     Adenovirus F40/41 Not Detected     Astrovirus Not Detected     Norovirus GI/GII Not Detected     Rotavirus A Not Detected     Sapovirus (I, II, IV or V) Not Detected    Narrative:      If Aeromonas, Staphylococcus aureus or Bacillus cereus are suspected, please order [id number]: Stool Culture, Aeromonas, S aureus, B Cereus.    Blood Culture - Blood, Arm, Left [[id number]]  (Abnormal)  (Susceptibility) Collected: 12/13/24 2138    Lab Status: Final result Specimen: Blood from Arm, Left Updated: 12/16/24 0636     Blood Culture Escherichia coli     Isolated from Aerobic Bottle     Gram Stain Aerobic Bottle Gram negative bacilli    Narrative:      Less than seven (7) mL's of blood was collected.  Insufficient quantity may yield false negative results.    Susceptibility        Escherichia coli      FAREED      Amoxicillin + Clavulanate Susceptible      Ampicillin Resistant      Ampicillin + Sulbactam Susceptible      Cefazolin (Non Urine) Susceptible      Cefepime Susceptible      Ceftazidime Susceptible      Ceftriaxone Susceptible      Gentamicin Susceptible      Levofloxacin Intermediate      Piperacillin + Tazobactam Susceptible      Trimethoprim + Sulfamethoxazole Susceptible                       Susceptibility Comments       Escherichia coli    With the exception of urinary-sourced infections, aminoglycosides should not be used as monotherapy.               Blood Culture ID, PCR - Blood, Arm, Left [[id number]]  (Abnormal) Collected: 12/13/24 2138    Lab Status: Final result Specimen: Blood from Arm, Left Updated: 12/14/24 1155     BCID, PCR Escherichia coli. Identification by BCID2 PCR.     BOTTLE TYPE Aerobic Bottle    Narrative:      No  resistance genes detected.    Urine Culture - Urine, Urine, Clean Catch [[id number]]  (Abnormal) Collected: 12/13/24 2121    Lab Status: Preliminary result Specimen: Urine, Clean Catch Updated: 12/15/24 1213     Urine Culture >100,000 CFU/mL Escherichia coli    Narrative:      Colonization of the urinary tract without infection is common. Treatment is discouraged unless the patient is symptomatic, pregnant, or undergoing an invasive urologic procedure.    Blood Culture - Blood, Arm, Left [[id number]]  (Normal) Collected: 12/13/24 2043    Lab Status: Preliminary result Specimen: Blood from Arm, Left Updated: 12/15/24 2101     Blood Culture No growth at 2 days              Assessment:    Acute pyelonephritis    Abdominal pain    Bacteremia due to Gram-negative bacteria    Pyelonephritis  This presentation in the above context is consistent with:  1-E. coli bacteremic pyelonephritis  2-history of recurrent UTI suggestive of structural or functional  tract abnormalities though not clearly reported on the CT scan of the abdomen and pelvis performed at the time of admission on 12/13/2024  3-history of PCOS  4-other diagnoses per primary team.    Recommendations/Discussions:  See my discussion and recommendations on 12/15/2024  Based on the sensitivity of the E. coli I think her antibiotic regimen can be switched to oral Bactrim as quinolone is not an option.  Repeat blood cultures are negative.  Patient could be discharged on oral Bactrim for 10 days for bacteremic E. coli pyelonephritis of the right kidney.  Side effects of Bactrim explained to the patient including education about getting her BMP checked on Wednesday or Thursday by her primary care provider and avoid eating too much bananas or drinking too much orange juice while taking Bactrim.  Discussed with Dr. Griffin about need for outpatient follow-up with urology service.  Discussed with patient's caring nurse.  Pepe Tena MD  12/16/2024  08:49 EST    Parts of  this note may be an electronic transcription/translation of spoken language to printed text using the Dragon dictation system.

## 2024-12-16 NOTE — CASE MANAGEMENT/SOCIAL WORK
Case Management Discharge Note      Final Note: Home, no additional CCP needs.         Selected Continued Care - Admitted Since 12/13/2024       Destination    No services have been selected for the patient.                Durable Medical Equipment    No services have been selected for the patient.                Dialysis/Infusion    No services have been selected for the patient.                Home Medical Care    No services have been selected for the patient.                Therapy    No services have been selected for the patient.                Community Resources    No services have been selected for the patient.                Community & DME    No services have been selected for the patient.                         Final Discharge Disposition Code: 01 - home or self-care

## 2024-12-17 ENCOUNTER — NURSE TRIAGE (OUTPATIENT)
Dept: CALL CENTER | Facility: HOSPITAL | Age: 29
End: 2024-12-17
Payer: MEDICAID

## 2024-12-17 ENCOUNTER — TRANSITIONAL CARE MANAGEMENT TELEPHONE ENCOUNTER (OUTPATIENT)
Dept: CALL CENTER | Facility: HOSPITAL | Age: 29
End: 2024-12-17
Payer: MEDICAID

## 2024-12-17 NOTE — PAYOR COMM NOTE
"Nkechi Hamilton (29 y.o. Female)    PLEASE SEE ATTACHED FOR INPT AUTH/DC NOTICE   OBS 12/13 INPATIENT 12/14  REF#[id number]   PLEASE CALL RASHARD PALMER RN/ DEPT [phone] FAX  DEPT [phone]  THANK YOU   RASHARD PALMER RN  [facility]      Date of Birth   1995    Social Security Number       Address   [address] [place] [address]    Home Phone   [phone]    MRN   [id number]       Religious   Orthodoxy    Marital Status                               Admission Date   12/13/24 OBS  12/14/24 INPATIENT    Admission Type   Emergency    Admitting Provider   Isaías Griffin MD    Attending Provider       Department, Room/Bed   [address], S408/1       Discharge Date   12/16/2024    Discharge Disposition   Home or Self Care    Discharge Destination                                 Attending Provider: (none)   Allergies: Provera [Medroxyprogesterone], Metformin    Isolation: None   Infection: None   Code Status: Prior    Ht: 160 cm (63\")   Wt: 94.7 kg (208 lb 11.2 oz)    Admission Cmt: None   Principal Problem: Acute pyelonephritis [N10]                   Active Insurance as of 12/13/2024       Primary Coverage       Payor Plan Insurance Group Employer/Plan Group    [facility] COMMUNITY PLAN [facility] COMMUNITY PLAN [facility]       Payor Plan Address Payor Plan Phone Number Payor Plan Fax Number Effective Dates    [address]   11/1/2023 - None Entered    [facility] [id number]         Subscriber Name Subscriber Birth Date Member ID       NKECHI HAMILTON 1995 [id number]                     Emergency Contacts        (Rel.) Home Phone Work Phone Mobile Phone    NUZHAT JOHNSON (Spouse) [phone] -- [phone]              [place]: [facility] [id number]  Tax ID [id number]     History & Physical        Adolfo Singh APRN at 12/13/24 2330       Attestation signed by Anshu Barton MD at 12/14/24 0910    MD " ATTESTATION NOTE  I supervised care provided by the midlevel provider. We have discussed this patient's history, physical exam, and treatment plan. I have reviewed the midlevel provider's note and I agree with the midlevel provider's findings and plan of care.   SHARED VISIT: This visit was performed by BOTH a physician and an APC. The substantive portion of the medical decision making was performed by this attesting physician who made or approved the management plan and takes responsibility for patient management.                    [facility]   HISTORY AND PHYSICAL    Patient Name: Nkechi Saavedra  : 1995  MRN: [id number]  Primary Care Physician:  Michelle Montes APRN  Date of admission: 2024    Subjective  Subjective     Chief Complaint:   Chief Complaint   Patient presents with    Abdominal Pain         HPI:    Nkechi Saavedra is a 29 y.o. female with medical history significant for PCOS presents to the ED with worsening dysuria that started 10 days ago and right-sided flank pain onset about 4 days ago.  She states that she feels like she has a balloon in her pelvis and has been nauseated without vomiting.  She reports chills, and she is febrile in the ED with a temperature 101 °F.  She denies chest pain, SOA, bloody or tarry stools.  In the ED her CT A/P was unremarkable, however, UA suspicious for UTI.    She states this is her second UTI in the past few months.  She was in [place] last month and developed a UTI and has been taking leftover antibiotics from prior illness.    Review of Systems   All systems were reviewed and negative except for: Dysuria, nausea, right flank pain      Personal History     Past Medical History:   Diagnosis Date    Abnormal ultrasound of thyroid gland 2023    Fatty liver     Frequent UTI     History of peritonitis     childhood    Polycystic ovary syndrome     Polyp, corpus uteri     Thyroid disorder     Thyroid nodule 2023        Past Surgical History:   Procedure Laterality Date    APPENDECTOMY      age 4    D & C HYSTEROSCOPY N/A 01/23/2023    Procedure: DILATATION AND CURETTAGE HYSTEROSCOPY WITH MYOSURE;  Surgeon: Fabricio Barrera MD;  Location: [facility] OR;  Service: Obstetrics/Gynecology;  Laterality: N/A;    DIAGNOSTIC LAPAROSCOPY      for peritonitis in childhood       Family History: family history includes Diabetes in her sister. Otherwise pertinent FHx was reviewed and not pertinent to current issue.    Social History:  reports that she has never smoked. She has never been exposed to tobacco smoke. She has never used smokeless tobacco. She reports that she does not currently use alcohol. She reports that she does not use drugs.    Home Medications:  Prenatal Vitamin, SUMAtriptan, clomiPHENE, naproxen, and vitamin D    Allergies:  Allergies   Allergen Reactions    Provera [Medroxyprogesterone] Other (See Comments)     Suicidal Thoughts./ depression    Metformin GI Intolerance       Objective  Objective     Vitals:   Temp:  [101 °F (38.3 °C)-101.3 °F (38.5 °C)] 101 °F (38.3 °C)  Heart Rate:  [109-125] 112  Resp:  [16-20] 16  BP: (121-143)/(76-98) 127/76   Physical Exam:     Constitutional: Awake, alert. Well developed for age. Nontoxic appearing.   Eyes: PERRL x2, sclerae anicteric, no conjunctival injection. No EOM abnormalities.   HENT: NCAT, mucous membranes moist,   Neck: Supple, no thyromegaly, no lymphadenopathy, trachea midline  Respiratory: Clear to auscultation bilaterally, nonlabored respirations   Cardiovascular: RRR, no murmurs, rubs, or gallops, palpable pedal pulses bilaterally. No appreciable edema.   Gastrointestinal: Positive bowel sounds, soft, nontender, not distended.   Musculoskeletal: No bilateral ankle edema, no clubbing or cyanosis to extremities. No obvious deformities.   Psychiatric: Appropriate affect, cooperative. Converses appropriately for age.   Neurologic: Oriented x 3, strength  symmetric in all extremities. Cranial nerves grossly intact to confrontation, speech clear  Skin: No rashes, skin intact.     Result Review   Result Review:  I have personally reviewed the results from the time of this admission to 12/14/2024 00:22 EST and agree with these findings:  [x]  Laboratory list / accordion  []  Microbiology  [x]  Radiology  []  EKG/Telemetry   []  Cardiology/Vascular   []  Pathology  []  Old records  []  Other:  Most notable findings include: Nitrite positive UA with 2+ blood, 3+ leuks, 2+ protein, too many WBC RBCs to count, 1+ bacteria and 3-6 epithelial cells.  CT A/P is unremarkable, WBC 11.5, hCG negative, lactate 1.9, unremarkable CMP.      Assessment & Plan  Assessment / Plan     Brief Patient Summary:  Nkechi Saavedra is a 29 y.o. female who presents with dysuria worsening over the last 10 days and right flank pain that started 4 days ago complaining of feeling like she has a balloon in her bladder.  In the ED CT A/P is unremarkable however, UA is suspicious for UTI.    Active Hospital Problems:  Active Hospital Problems    Diagnosis     **Abdominal pain      Plan:     Urinary tract infection  -Worsening dysuria, right flank pain  -Nitrate positive UA with blood, leuks, protein, bacteria  -CT A/P unremarkable  -Lactate 1.9  -Blood and urine cultures pending  -Empiric Rocephin  -Zofran, Pyridium as needed  -Tylenol for fever  -Surveillance labs      VTE Prophylaxis:  Mechanical VTE prophylaxis orders are present.        CODE STATUS:    Level Of Support Discussed With: Patient  Code Status (Patient has no pulse and is not breathing): CPR (Attempt to Resuscitate)  Medical Interventions (Patient has pulse or is breathing): Full Support    Admission Status:  I believe this patient meets observation status.    Electronically signed by MIAH Greer, 12/14/24, 12:22 AM EST.        75 minutes has been spent by [facility] providers in the care  of this patient while under observation status      I have worn appropriate PPE during this patient encounter, sanitized my hands both with entering and exiting patient's room.    I have discussed plan of care with patient including advance care plan and/or surrogate decision maker.  Patient advises that their spouse, Phuc Rowley, will be their primary surrogate decision maker           Electronically signed by Anshu Barton MD at 12/14/24 0910          Emergency Department Notes        Fátima Brunson, RN at 12/14/24 0009          Nursing report ED to floor  Nkechi Saavedra  29 y.o.  female    HPI :  HPI  Stated Reason for Visit: patient reports dysuria for the last week, abdominal pain x 3 days. fever today, was seen at urgent care and sent to ED for further eval.  History Obtained From: patient    Chief Complaint  Chief Complaint   Patient presents with    Abdominal Pain       Admitting doctor:   Adam Hernandez MD    Admitting diagnosis:   The encounter diagnosis was Pyelonephritis of right kidney.    Code status:   Current Code Status       Date Active Code Status Order ID Comments User Context       12/13/2024 2336 CPR (Attempt to Resuscitate) [id number]  Adolfo Singh APRN ED        Question Answer    Code Status (Patient has no pulse and is not breathing) CPR (Attempt to Resuscitate)    Medical Interventions (Patient has pulse or is breathing) Full Support    Level Of Support Discussed With Patient                    Allergies:   Provera [medroxyprogesterone] and Metformin    Isolation:   No active isolations    Intake and Output  No intake or output data in the 24 hours ending 12/14/24 0009    Weight:       12/13/24 2023   Weight: 94.8 kg (209 lb)       Most recent vitals:   Vitals:    12/13/24 2143 12/13/24 2203 12/13/24 2232 12/13/24 2302   BP: 131/76 130/80 121/78 127/76   Pulse: 116 112 109 112   Resp:  16  16   Temp:       TempSrc:       SpO2: 98% 97% 98% 95%   Weight:       Height:            Active LDAs/IV Access:   Lines, Drains & Airways       Active LDAs       Name Placement date Placement time Site Days    Peripheral IV 12/13/24 2140 Left Antecubital 12/13/24 2140  Antecubital  less than 1                    Labs (abnormal labs have a star):   Labs Reviewed   COMPREHENSIVE METABOLIC PANEL - Abnormal; Notable for the following components:       Result Value    Sodium 135 (*)     Total Protein 8.7 (*)     ALT (SGPT) 40 (*)     All other components within normal limits    Narrative:     GFR Categories in Chronic Kidney Disease (CKD)      GFR Category          GFR (mL/min/1.73)    Interpretation  G1                     90 or greater         Normal or high (1)  G2                      60-89                Mild decrease (1)  G3a                   45-59                Mild to moderate decrease  G3b                   30-44                Moderate to severe decrease  G4                    15-29                Severe decrease  G5                    14 or less           Kidney failure          (1)In the absence of evidence of kidney disease, neither GFR category G1 or G2 fulfill the criteria for CKD.    eGFR calculation 2021 CKD-EPI creatinine equation, which does not include race as a factor   URINALYSIS W/ CULTURE IF INDICATED - Abnormal; Notable for the following components:    Appearance, UA Turbid (*)     Blood, UA Moderate (2+) (*)     Protein,  mg/dL (2+) (*)     Leuk Esterase, UA Large (3+) (*)     Nitrite, UA Positive (*)     All other components within normal limits    Narrative:     In absence of clinical symptoms, the presence of pyuria, bacteria, and/or nitrites on the urinalysis result does not correlate with infection.   CBC WITH AUTO DIFFERENTIAL - Abnormal; Notable for the following components:    WBC 11.55 (*)     RBC 5.71 (*)     MCV 76.9 (*)     MCH 25.0 (*)     RDW-SD 36.9 (*)     Neutrophil % 81.0 (*)     Lymphocyte % 15.2 (*)     Monocyte % 2.2 (*)     Neutrophils, Absolute  9.36 (*)     All other components within normal limits   URINALYSIS, MICROSCOPIC ONLY - Abnormal; Notable for the following components:    RBC, UA Too Numerous to Count (*)     WBC, UA Too Numerous to Count (*)     Bacteria, UA 1+ (*)     Squamous Epithelial Cells, UA 3-6 (*)     All other components within normal limits   LACTIC ACID, PLASMA - Normal   HCG, SERUM, QUALITATIVE - Normal   BLOOD CULTURE   BLOOD CULTURE   URINE CULTURE   GASTROINTESTINAL PANEL, PCR (PREFERRED) DOES NOT INCLUDE CDIFF   RAINBOW DRAW    Narrative:     The following orders were created for panel order [place] Draw.  Procedure                               Abnormality         Status                     ---------                               -----------         ------                     Green Top (Gel)[[id number]]                                  Final result               Lavender Top[[id number]]                                     Final result               Gold Top - SST[[id number]]                                   Final result               Light Blue Top[[id number]]                                   Final result                 Please view results for these tests on the individual orders.   CBC WITH AUTO DIFFERENTIAL   BASIC METABOLIC PANEL   CBC AND DIFFERENTIAL    Narrative:     The following orders were created for panel order CBC & Differential.  Procedure                               Abnormality         Status                     ---------                               -----------         ------                     CBC Auto Differential[[id number]]        Abnormal            Final result                 Please view results for these tests on the individual orders.   GREEN TOP   LAVENDER TOP   GOLD TOP - SST   LIGHT BLUE TOP       EKG:   No orders to display       Meds given in ED:   Medications   sodium chloride 0.9 % flush 10 mL (has no administration in time range)   cefTRIAXone (ROCEPHIN) 2,000 mg in sodium chloride 0.9 % 100 mL  MBP (2,000 mg Intravenous New Bag 12/13/24 6446)   sodium chloride 0.9 % flush 10 mL (has no administration in time range)   sodium chloride 0.9 % flush 10 mL (has no administration in time range)   sodium chloride 0.9 % infusion 40 mL (has no administration in time range)   Potassium Replacement - Follow Nurse / BPA Driven Protocol (has no administration in time range)   Magnesium Standard Dose Replacement - Follow Nurse / BPA Driven Protocol (has no administration in time range)   Phosphorus Replacement - Follow Nurse / BPA Driven Protocol (has no administration in time range)   Calcium Replacement - Follow Nurse / BPA Driven Protocol (has no administration in time range)   sennosides-docusate (PERICOLACE) 8.6-50 MG per tablet 2 tablet (has no administration in time range)     And   polyethylene glycol (MIRALAX) packet 17 g (has no administration in time range)     And   bisacodyl (DULCOLAX) EC tablet 5 mg (has no administration in time range)     And   bisacodyl (DULCOLAX) suppository 10 mg (has no administration in time range)   phenazopyridine (PYRIDIUM) tablet 100 mg (has no administration in time range)   ondansetron (ZOFRAN) injection 4 mg (has no administration in time range)   acetaminophen (TYLENOL) tablet 650 mg (has no administration in time range)   lactated ringers bolus 1,000 mL (0 mL Intravenous Stopped 12/14/24 0008)   HYDROmorphone (DILAUDID) injection 0.5 mg (0.5 mg Intravenous Given 12/13/24 2142)   ondansetron (ZOFRAN) injection 4 mg (4 mg Intravenous Given 12/13/24 2141)   acetaminophen (TYLENOL) tablet 1,000 mg (1,000 mg Oral Given 12/13/24 2144)       Imaging results:  CT Abdomen Pelvis Without Contrast    Result Date: 12/13/2024  Electronically signed by Clinton Edwards MD on 12-13-24 at 2300     Ambulatory status:   - independent    Social issues:   Social History     Socioeconomic History    Marital status:    Tobacco Use    Smoking status: Never     Passive exposure: Never     Smokeless tobacco: Never   Vaping Use    Vaping status: Never Used   Substance and Sexual Activity    Alcohol use: Not Currently    Drug use: Never    Sexual activity: Yes     Partners: Male     Birth control/protection: None       Peripheral Neurovascular       Neuro Cognitive  Sedation Group  POSS ([place] Opioid-Induced Sed Scale): 1 - Awake and alert    Learning       Respiratory       Abdominal Pain       Pain Assessments  Pain (Adult)  (0-10) Pain Rating: Rest: 3  Pain Location: flank  Pain Side/Orientation: right  Response to Pain Interventions: interventions effective per patient    NIH Stroke Scale       Fátima Brunson RN  12/14/24 00:09 EST     Electronically signed by Fátima Brunson RN at 12/14/24 0009       Suraj Capps MD at 12/13/24 2120           EMERGENCY DEPARTMENT ENCOUNTER    Room Number:  14/14  PCP: Michelle Montes APRN  Historian: Patient      HPI:  Chief Complaint: Dysuria and right flank pain  A complete HPI/ROS/PMH/PSH/SH/FH are unobtainable due to: Language barrier history obtained through video   Context: Nkechi Saavedra is a 29 y.o. female who presents to the ED c/o dysuria and flank pain.  Patient states she has been having dysuria for about 10 days.  Started having right flank pain 4 days ago.  Patient states she [place]  Patient has had nausea without vomiting.  Has had some discomfort in her vaginal area.  Has had no vaginal bleeding or discharge.  Has had fevers tonight.            PAST MEDICAL HISTORY  Active Ambulatory Problems     Diagnosis Date Noted    Irregular menstrual bleeding 12/12/2022    Polyp, corpus uteri 12/13/2022    Dyspepsia 02/01/2023    Elevated liver enzymes 02/22/2023    Hair loss 02/22/2023    Encounter for hepatitis C screening test for low risk patient 02/22/2023    Changes in skin texture 02/22/2023    Elevated blood pressure reading 04/12/2023    Seasonal allergies  04/12/2023    Encounter for screening breast examination and discussion of breast self examination 04/12/2023    Mixed hyperlipidemia 04/26/2023    Class 2 obesity due to excess calories without serious comorbidity with body mass index (BMI) of 36.0 to 36.9 in adult 04/28/2023    History of attempted suicide 07/28/2023    Depression with anxiety 07/28/2023    Sinus headache 07/31/2023    Intractable migraine without aura and without status migrainosus 07/31/2023    Low TSH level 02/02/2024    Vitamin D deficiency 02/02/2024    Eye discomfort, right 07/31/2024    PCOS (polycystic ovarian syndrome) 08/06/2024    Infertility, female 08/06/2024     Resolved Ambulatory Problems     Diagnosis Date Noted    Thyroid nodule 02/22/2023     Past Medical History:   Diagnosis Date    Abnormal ultrasound of thyroid gland 02/22/2023    Fatty liver     Frequent UTI     History of peritonitis     Polycystic ovary syndrome     Thyroid disorder          PAST SURGICAL HISTORY  Past Surgical History:   Procedure Laterality Date    APPENDECTOMY      age 4    D & C HYSTEROSCOPY N/A 01/23/2023    Procedure: DILATATION AND CURETTAGE HYSTEROSCOPY WITH MYOSURE;  Surgeon: Fabricio Barrera MD;  [facility];  Service: Obstetrics/Gynecology;  Laterality: N/A;    DIAGNOSTIC LAPAROSCOPY      for peritonitis in childhood         FAMILY HISTORY  Family History   Problem Relation Age of Onset    Diabetes Sister     Malig Hyperthermia Neg Hx     Breast cancer Neg Hx     Ovarian cancer Neg Hx     Uterine cancer Neg Hx     Colon cancer Neg Hx          SOCIAL HISTORY  Social History     Socioeconomic History    Marital status:    Tobacco Use    Smoking status: Never     Passive exposure: Never    Smokeless tobacco: Never   Vaping Use    Vaping status: Never Used   Substance and Sexual Activity    Alcohol use: Not Currently    Drug use: Never    Sexual activity: Yes     Partners: Male     Birth control/protection: None          ALLERGIES  Provera [medroxyprogesterone] and Metformin        REVIEW OF SYSTEMS  Review of Systems   None      PHYSICAL EXAM  ED Triage Vitals   Temp Heart Rate Resp BP SpO2   12/13/24 2020 12/13/24 2020 12/13/24 2020 12/13/24 2023 12/13/24 2020   (!) 101 °F (38.3 °C) (!) 125 20 137/98 96 %      Temp src Heart Rate Source Patient Position BP Location FiO2 (%)   12/13/24 2020 -- -- -- --   Tympanic           Physical Exam      GENERAL: no acute distress  HENT: nares patent  EYES: no scleral icterus  CV: regular rhythm, normal rate  RESPIRATORY: normal effort  ABDOMEN: soft.  Right sided CVA tenderness  MUSCULOSKELETAL: no deformity  NEURO: alert, moves all extremities, follows commands  PSYCH:  calm, cooperative  SKIN: warm, dry    Vital signs and nursing notes reviewed.          LAB RESULTS  Recent Results (from the past 24 hours)   POC Urinalysis Dipstick, Multipro (Automated Dipstick)    Collection Time: 12/13/24  7:29 PM    Specimen: Urine   Result Value Ref Range    Color Yellow     Clarity, UA Cloudy (A)     Glucose, UA Negative mg/dL    Bilirubin Negative     Ketones, UA 15 mg/dL (A)     Specific Gravity  1.015 1.005 - 1.030    Blood, UA Small (A)     pH, Urine 5.5 5.0 - 8.0    Protein, POC 30 mg/dL (A) mg/dL    Urobilinogen, UA 0.2 E.U./dL     Nitrite, UA Negative     Leukocytes Moderate (2+) (A)    Comprehensive Metabolic Panel    Collection Time: 12/13/24  8:43 PM    Specimen: Arm, Left; Blood   Result Value Ref Range    Glucose 92 65 - 99 mg/dL    BUN 7 6 - 20 mg/dL    Creatinine 0.76 0.57 - 1.00 mg/dL    Sodium 135 (L) 136 - 145 mmol/L    Potassium 4.0 3.5 - 5.2 mmol/L    Chloride 100 98 - 107 mmol/L    CO2 22.9 22.0 - 29.0 mmol/L    Calcium 9.6 8.6 - 10.5 mg/dL    Total Protein 8.7 (H) 6.0 - 8.5 g/dL    Albumin 4.6 3.5 - 5.2 g/dL    ALT (SGPT) 40 (H) 1 - 33 U/L    AST (SGOT) 26 1 - 32 U/L    Alkaline Phosphatase 88 39 - 117 U/L    Total Bilirubin 0.5 0.0 - 1.2 mg/dL    Globulin 4.1 gm/dL    A/G  Ratio 1.1 g/dL    BUN/Creatinine Ratio 9.2 7.0 - 25.0    Anion Gap 12.1 5.0 - 15.0 mmol/L    eGFR 110.3 >60.0 mL/min/1.73   Lactic Acid, Plasma    Collection Time: 12/13/24  8:43 PM    Specimen: Arm, Left; Blood   Result Value Ref Range    Lactate 1.9 0.5 - 2.0 mmol/L   CBC Auto Differential    Collection Time: 12/13/24  8:43 PM    Specimen: Arm, Left; Blood   Result Value Ref Range    WBC 11.55 (H) 3.40 - 10.80 10*3/mm3    RBC 5.71 (H) 3.77 - 5.28 10*6/mm3    Hemoglobin 14.3 12.0 - 15.9 g/dL    Hematocrit 43.9 34.0 - 46.6 %    MCV 76.9 (L) 79.0 - 97.0 fL    MCH 25.0 (L) 26.6 - 33.0 pg    MCHC 32.6 31.5 - 35.7 g/dL    RDW 13.5 12.3 - 15.4 %    RDW-SD 36.9 (L) 37.0 - 54.0 fl    MPV 9.2 6.0 - 12.0 fL    Platelets 334 140 - 450 10*3/mm3    Neutrophil % 81.0 (H) 42.7 - 76.0 %    Lymphocyte % 15.2 (L) 19.6 - 45.3 %    Monocyte % 2.2 (L) 5.0 - 12.0 %    Eosinophil % 1.0 0.3 - 6.2 %    Basophil % 0.3 0.0 - 1.5 %    Immature Grans % 0.3 0.0 - 0.5 %    Neutrophils, Absolute 9.36 (H) 1.70 - 7.00 10*3/mm3    Lymphocytes, Absolute 1.75 0.70 - 3.10 10*3/mm3    Monocytes, Absolute 0.25 0.10 - 0.90 10*3/mm3    Eosinophils, Absolute 0.11 0.00 - 0.40 10*3/mm3    Basophils, Absolute 0.04 0.00 - 0.20 10*3/mm3    Immature Grans, Absolute 0.04 0.00 - 0.05 10*3/mm3    nRBC 0.0 0.0 - 0.2 /100 WBC   Green Top (Gel)    Collection Time: 12/13/24  8:43 PM   Result Value Ref Range    Extra Tube Hold for add-ons.    Lavender Top    Collection Time: 12/13/24  8:43 PM   Result Value Ref Range    Extra Tube hold for add-on    Gold Top - SST    Collection Time: 12/13/24  8:43 PM   Result Value Ref Range    Extra Tube Hold for add-ons.    Light Blue Top    Collection Time: 12/13/24  8:43 PM   Result Value Ref Range    Extra Tube Hold for add-ons.    hCG, Serum, Qualitative    Collection Time: 12/13/24  8:43 PM    Specimen: Arm, Left; Blood   Result Value Ref Range    HCG Qualitative Negative Negative   Urinalysis With Culture If Indicated - Urine,  Clean Catch    Collection Time: 12/13/24  9:21 PM    Specimen: Urine, Clean Catch   Result Value Ref Range    Color, UA Yellow Yellow, Straw    Appearance, UA Turbid (A) Clear    pH, UA 5.5 5.0 - 8.0    Specific Gravity, UA 1.012 1.005 - 1.030    Glucose, UA Negative Negative    Ketones, UA Negative Negative    Bilirubin, UA Negative Negative    Blood, UA Moderate (2+) (A) Negative    Protein,  mg/dL (2+) (A) Negative    Leuk Esterase, UA Large (3+) (A) Negative    Nitrite, UA Positive (A) Negative    Urobilinogen, UA 0.2 E.U./dL 0.2 - 1.0 E.U./dL   Urinalysis, Microscopic Only - Urine, Clean Catch    Collection Time: 12/13/24  9:21 PM    Specimen: Urine, Clean Catch   Result Value Ref Range    RBC, UA Too Numerous to Count (A) None Seen, 0-2 /HPF    WBC, UA Too Numerous to Count (A) None Seen, 0-2 /HPF    Bacteria, UA 1+ (A) None Seen /HPF    Squamous Epithelial Cells, UA 3-6 (A) None Seen, 0-2 /HPF    Hyaline Casts, UA None Seen None Seen /LPF    WBC Clumps, UA Present None Seen /HPF    Methodology Automated Microscopy        Ordered the above labs and reviewed the results.        RADIOLOGY  CT Abdomen Pelvis Without Contrast    Result Date: 12/13/2024  Patient: VERN WOLFF  Time Out: 23:00 Exam(s): CT ABDOMEN + PELVIS Without Contrast EXAM:   CT Abdomen and Pelvis Without Intravenous Contrast CLINICAL HISTORY:    Reason for exam: flank pain. TECHNIQUE:   Axial computed tomography images of the abdomen and pelvis without intravenous contrast.  CTDI is 13.94 mGy and DLP is 740.9 mGy-cm.  This CT exam was performed according to the principle of ALARA (As Low As Reasonably Achievable) by using one or more of the following dose reduction techniques: automated exposure control, adjustment of the mA and or kV according to patient size, and or use of iterative reconstruction technique. COMPARISON:   No relevant prior studies available. FINDINGS:   Lung bases:  Unremarkable.  No mass.  No  consolidation.  ABDOMEN:   Liver:  Unremarkable.   Gallbladder and bile ducts:  Unremarkable.  No calcified stones.  No ductal dilation.   Pancreas:  Unremarkable.  No ductal dilation.   Spleen:  Unremarkable.  No splenomegaly.   Adrenals:  Unremarkable.  No mass.   Kidneys and ureters:  Unremarkable.  No hydronephrosis, nephrolithiasis,  or obstructive uropathy.   Stomach and bowel:  Unremarkable.  No obstruction.  No mucosal thickening.  PELVIS:   Appendix:  No findings to suggest acute appendicitis.   Bladder:  Unremarkable.  No stones.   Reproductive:  Unremarkable as visualized.  ABDOMEN and PELVIS:   Intraperitoneal space:  Unremarkable.  No free air.  No significant fluid collection.   Bones joints:  No acute fracture.  No dislocation.   Soft tissues:  Unremarkable.   Vasculature:  Unremarkable.  No abdominal aortic aneurysm.   Lymph nodes:  Unremarkable.  No enlarged lymph nodes. IMPRESSION:       No hydronephrosis, nephrolithiasis, or obstructive uropathy.     Electronically signed by Clinton Edwards MD on 12-13-24 at 2300     Ordered the above noted radiological studies.  CT abdomen and pain interpreted by me and shows no evidence of stone          PROCEDURES  Procedures          MEDICATIONS GIVEN IN ER  Medications   sodium chloride 0.9 % flush 10 mL (has no administration in time range)   cefTRIAXone (ROCEPHIN) 2,000 mg in sodium chloride 0.9 % 100 mL MBP (has no administration in time range)   lactated ringers bolus 1,000 mL (1,000 mL Intravenous New Bag 12/13/24 2142)   HYDROmorphone (DILAUDID) injection 0.5 mg (0.5 mg Intravenous Given 12/13/24 2142)   ondansetron (ZOFRAN) injection 4 mg (4 mg Intravenous Given 12/13/24 2141)   acetaminophen (TYLENOL) tablet 1,000 mg (1,000 mg Oral Given 12/13/24 2144)                   MEDICAL DECISION MAKING, PROGRESS, and CONSULTS    All labs have been independently reviewed by me.  All radiology studies have been reviewed by me and I have also reviewed the radiology  report.   EKG's independently viewed and interpreted by me.  Discussion below represents my analysis of pertinent findings related to patient's condition, differential diagnosis, treatment plan and final disposition.      Additional sources:  - Discussed/ obtained information from independent historians: None    - External (non-ED) record review: Epic reviewed patient seen at urgent care today for pyelonephritis    - Chronic or social conditions impacting care: None    - Shared decision making: None      Orders placed during this visit:  Orders Placed This Encounter   Procedures    Blood Culture - Blood,    Blood Culture - Blood,    Urine Culture - Urine,    CT Abdomen Pelvis Without Contrast    Comprehensive Metabolic Panel    Lactic Acid, Plasma    [place] Draw    Urinalysis With Culture If Indicated - Urine, Clean Catch    CBC Auto Differential    hCG, Serum, Qualitative    Urinalysis, Microscopic Only - Urine, Clean Catch    Undress & Gown    Continuous Pulse Oximetry    Vital Signs    Oxygen Therapy- Nasal Cannula; Titrate 1-6 LPM Per SpO2; 90 - 95%    Insert Peripheral IV    CBC & Differential    Green Top (Gel)    Lavender Top    Gold Top - SST    Light Blue Top         Additional orders considered but not ordered:  None        Differential diagnosis includes but is not limited to:    Pyelonephritis versus kidney stone      Independent interpretation of labs, radiology studies, and discussions with consultants:  ED Course as of 12/13/24 2305   Fri Dec 13, 2024   2253 22:54 EST  Patient presents for fever flank pain dysuria.  Patient appears to have pyelonephritis.  Has been given Rocephin.  Cultures have been obtained but patient has been on antibiotics.  Has been discussed with Francisco GUEVARA with obs unit. [SL]      ED Course User Index  [SL] Suraj Capps MD                 DIAGNOSIS  Final diagnoses:   Pyelonephritis of right kidney         DISPOSITION  admit            Latest Documented Vital Signs:  As  of 23:05 EST  BP- 130/80 HR- 112 Temp- (!) 101 °F (38.3 °C) (Tympanic) O2 sat- 97%              --    Please note that portions of this were completed with a voice recognition program.       Note Disclaimer: At [facility], we believe that sharing information builds trust and better relationships. You are receiving this note because you are receiving care at [facility] or recently visited. It is possible you will see health information before a provider has talked with you about it. This kind of information can be easy to misunderstand. To help you fully understand what it means for your health, we urge you to discuss this note with your provider.            Suraj Capps MD  12/13/24 2307      Electronically signed by Suraj Capps MD at 12/13/24 2307       Medication Administration Report for Nkechi Hamilton as of 12/14/24 through 12/16/24    Given  Hold  Not Given  Due  Canceled Entry  Other Actions  Time  Time  (Time)  Time  Time-Action    Discontinued  Completed  Future  MAR Hold  Linked    Medications  12/14/24  12/15/24  12/16/24    Completed Medications    acetaminophen (TYLENOL) tablet 1,000 mg  Dose: 1,000 mg  Freq: Once Route: PO  Start: 12/13/24 2137 End: 12/13/24 2144    If given for fever, use fever parameter: fever greater than 100.4 °F  Based on patient request - if ordered for moderate or severe pain, provider allows for administration of a medication prescribed for a lower pain scale.    Do not exceed 4 grams of acetaminophen in a 24 hr period. Max dose of 2gm for AST/ALT greater than 120 units/L.    If given for pain, use the following pain scale:   Mild Pain = Pain Score of 1-3, CPOT 1-2  Moderate Pain = Pain Score of 4-6, CPOT 3-4  Severe Pain = Pain Score of 7-10, CPOT 5-8  cefTRIAXone (ROCEPHIN) 2,000 mg in sodium chloride 0.9 % 100 mL MBP  Dose: 2,000 mg  Freq: Once Route: IV  Indications of Use: URINARY TRACT INFECTION  Start: 12/13/24 2309 End: 12/14/24  "0025    Admin Instructions:  25 mg may be given IV push over less than 1 minute.  Caution: Look alike/sound alike drug alert. This med may be ordered in other forms and routes. Before giving verify the last time the drug was given by any route/form.  913-Given    HYDROmorphone (DILAUDID) injection 0.5 mg  Dose: 0.5 mg  Freq: Once Route: IV  Start: 24 End: 24    Based on patient request - if ordered for moderate or severe pain, provider allows for administration of a medication prescribed for a lower pain scale.  If given for pain, use the following pain scale:  Mild Pain = Pain Score of 1-3, CPOT 1-2  Moderate Pain = Pain Score of 4-6, CPOT 3-4  Severe Pain = Pain Score of 7-10, CPOT 5-8    ketorolac (TORADOL) injection 30 mg  Dose: 30 mg  Freq: Once Route: IV  Start: 24 End: 24  Admin Instructions:     Physician Progress Notes (last 72 hours)        Isaías Griffin MD at 24 1208            Enter Query Response Below      Query Response: Sepsis ruled in              If applicable, please update the problem list.   Patient: Nkechi Hamilton        : 1995  Account: [id number]           Admit Date:         How to Respond to this query:       a. Click New Note     b. Answer query within the yellow box.                c. Update the Problem List, if applicable.      If you have any questions about this query contact me at: [email or web address]     Dr. Griffin,     29-year-old female admitted 2024 (OBS), 2024 (IP) with UTI per H&P.  -15 Hospitalist Progress Notes report \"Sepsis -Patient was tachycardic on admission, Tmax 101.1, meeting criteria for sepsis\".  Discharge Summary notes \"Acute pyelonephritis\" and \"E coli bacteremia\".   WBC: 11.55 (), 11.57 (), 13.43 (12/15).  Heart rate: 125 (2020), 116 (2143), 112 (2203), 123 (725), 112 (2018).  Temperature: 101 F (2020), 101.1 F " "(725), 100.4 F (2018).  Treatment included: Infectious Disease Consult, 1L IV Normal Saline Bolus (2142), IV Rocephin (-15), Pyridium, Bactrim DS, and 2,079mL Sepsis Fluid LR Bolus ( 1026). Per Discharge Summary, \"Patient was discharged on Bactrim for 10 more days per infectious disease recommendations.\"    Please clarify the following:    Sepsis ruled in  Sepsis ruled out  Other (please specify) ______  Unable to determine    By submitting this query, we are merely seeking further clarification of documentation to accurately reflect all conditions that you are monitoring, evaluating, treating or that extend the hospitalization or utilize additional resources of care. Please utilize your independent clinical judgment when addressing the question(s) above.     This query and your response, once completed, will be entered into the legal medical record.    Sincerely,  TOBI Gonzalez, RN   Clinical Documentation Integrity Program       Electronically signed by Isaías Griffin MD at 24 1513       Pepe Tena MD at 24 0849            Infectious Diseases Progress Note    Pepe Tena MD     [facility]  [place]: 2 days  Patient Identification:  Name: Nkechi Saavedra  Age: 29 y.o.  Sex: female  :  1995  MRN: [id number]         Primary Care Physician: Michelle Montes APRN        Subjective: Feeling much better with decreased pain on the right side.  Denies any fever and chills.  Interval History: See consultation note.    Objective:    Scheduled Meds:cefTRIAXone, 2,000 mg, Intravenous, Q24H  sodium chloride, 10 mL, Intravenous, Q12H  vitamin D, 50,000 Units, Oral, Weekly      Continuous Infusions:     Vital signs in last 24 hours:  Temp:  [97.5 °F (36.4 °C)-98.7 °F (37.1 °C)] 97.9 °F (36.6 °C)  Heart Rate:  [] 88  Resp:  [16-18] 18  BP: (113-123)/(70-86) 123/78    Intake/Output:    Intake/Output Summary (Last 24 hours) at 2024 " "0849  Last data filed at 12/16/2024 0020  Gross per 24 hour   Intake 720 ml   Output --   Net 720 ml       Exam:  /78   Pulse 88   Temp 97.9 °F (36.6 °C)   Resp 18   Ht 160 cm (63\")   Wt 94.7 kg (208 lb 11.2 oz)   LMP 07/01/2024   SpO2 99%   BMI 36.97 kg/m²   Patient is examined using the personal protective equipment as per guidelines from infection control for this particular patient as enacted.  Hand washing was performed before and after patient interaction.  General Appearance:    Alert, cooperative, no distress, AAOx3                          Head:    Normocephalic, without obvious abnormality, atraumatic                           Eyes:    PERRL, conjunctivae/corneas clear, EOM's intact, both eyes                         Throat:   Lips, tongue, gums normal; oral mucosa pink and moist                           Neck:   Supple, symmetrical, trachea midline, no JVD                         Lungs:    Clear to auscultation bilaterally, respirations unlabored                 Chest Wall:    No tenderness or deformity                          Heart:  S1-S2 regular                  Abdomen:   Almost resolved right CVA tenderness.                 Extremities:   Extremities normal, atraumatic, no cyanosis or edema                        Pulses:   Pulses palpable in all extremities                            Skin:   Skin is warm and dry,  no rashes or palpable lesions                  Neurologic: Alert and oriented x 3       Data Review:    I reviewed the patient's new clinical results.  Results from last 7 days   Lab Units 12/16/24  0531 12/15/24  0408 12/14/24 0433 12/13/24  2043   WBC 10*3/mm3 10.47 13.43* 11.57* 11.55*   HEMOGLOBIN g/dL 11.3* 11.3* 12.9 14.3   PLATELETS 10*3/mm3 255 247 298 334     Results from last 7 days   Lab Units 12/16/24  0531 12/15/24  0408 12/14/24  0433 12/13/24  2043   SODIUM mmol/L 135* 139 140 135*   POTASSIUM mmol/L 4.3 3.9 4.1 4.0   CHLORIDE mmol/L 102 106 105 100   CO2 " mmol/L 24.2 23.0 26.6 22.9   BUN mg/dL 8 8 8 7   CREATININE mg/dL 0.75 0.70 0.72 0.76   CALCIUM mg/dL 8.8 8.3* 9.2 9.6   GLUCOSE mg/dL 95 100* 103* 92     Microbiology Results (last 10 days)       Procedure Component Value - Date/Time    Blood Culture - Blood, Hand, Left [[id number]]  (Normal) Collected: 12/15/24 0759    Lab Status: Preliminary result Specimen: Blood from Hand, Left Updated: 12/16/24 0830     Blood Culture No growth at 24 hours    Narrative:      Less than seven (7) mL's of blood was collected.  Insufficient quantity may yield false negative results.    Gastrointestinal Panel, PCR - Stool, Per Rectum [[id number]]  (Normal) Collected: 12/14/24 1032    Lab Status: Final result Specimen: Stool from Per Rectum Updated: 12/14/24 1237     Campylobacter Not Detected     Plesiomonas shigelloides Not Detected     Salmonella Not Detected     Vibrio Not Detected     Vibrio cholerae Not Detected     Yersinia enterocolitica Not Detected     Enteroaggregative E. coli (EAEC) Not Detected     Enteropathogenic E. coli (EPEC) Not Detected     Enterotoxigenic E. coli (ETEC) lt/st Not Detected     Shiga-like toxin-producing E. coli (STEC) stx1/stx2 Not Detected     Shigella/Enteroinvasive E. coli (EIEC) Not Detected     Cryptosporidium Not Detected     Cyclospora cayetanensis Not Detected     Entamoeba histolytica Not Detected     Giardia lamblia Not Detected     Adenovirus F40/41 Not Detected     Astrovirus Not Detected     Norovirus GI/GII Not Detected     Rotavirus A Not Detected     Sapovirus (I, II, IV or V) Not Detected    Narrative:      If Aeromonas, Staphylococcus aureus or Bacillus cereus are suspected, please order [id number]: Stool Culture, Aeromonas, S aureus, B Cereus.    Blood Culture - Blood, Arm, Left [[id number]]  (Abnormal)  (Susceptibility) Collected: 12/13/24 2138    Lab Status: Final result Specimen: Blood from Arm, Left Updated: 12/16/24 0636     Blood Culture Escherichia coli     Isolated from Aerobic  Bottle     Gram Stain Aerobic Bottle Gram negative bacilli    Narrative:      Less than seven (7) mL's of blood was collected.  Insufficient quantity may yield false negative results.    Susceptibility        Escherichia coli      FAREED      Amoxicillin + Clavulanate Susceptible      Ampicillin Resistant      Ampicillin + Sulbactam Susceptible      Cefazolin (Non Urine) Susceptible      Cefepime Susceptible      Ceftazidime Susceptible      Ceftriaxone Susceptible      Gentamicin Susceptible      Levofloxacin Intermediate      Piperacillin + Tazobactam Susceptible      Trimethoprim + Sulfamethoxazole Susceptible                       Susceptibility Comments       Escherichia coli    With the exception of urinary-sourced infections, aminoglycosides should not be used as monotherapy.               Blood Culture ID, PCR - Blood, Arm, Left [[id number]]  (Abnormal) Collected: 12/13/24 2138    Lab Status: Final result Specimen: Blood from Arm, Left Updated: 12/14/24 1155     BCID, PCR Escherichia coli. Identification by BCID2 PCR.     BOTTLE TYPE Aerobic Bottle    Narrative:      No resistance genes detected.    Urine Culture - Urine, Urine, Clean Catch [[id number]]  (Abnormal) Collected: 12/13/24 2121    Lab Status: Preliminary result Specimen: Urine, Clean Catch Updated: 12/15/24 1213     Urine Culture >100,000 CFU/mL Escherichia coli    Narrative:      Colonization of the urinary tract without infection is common. Treatment is discouraged unless the patient is symptomatic, pregnant, or undergoing an invasive urologic procedure.    Blood Culture - Blood, Arm, Left [[id number]]  (Normal) Collected: 12/13/24 2043    Lab Status: Preliminary result Specimen: Blood from Arm, Left Updated: 12/15/24 2101     Blood Culture No growth at 2 days              Assessment:    Acute pyelonephritis    Abdominal pain    Bacteremia due to Gram-negative bacteria    Pyelonephritis  This presentation in the above context is consistent  with:  1-E. coli bacteremic pyelonephritis  2-history of recurrent UTI suggestive of structural or functional  tract abnormalities though not clearly reported on the CT scan of the abdomen and pelvis performed at the time of admission on 2024  3-history of PCOS  4-other diagnoses per primary team.    Recommendations/Discussions:  See my discussion and recommendations on 12/15/2024  Based on the sensitivity of the E. coli I think her antibiotic regimen can be switched to oral Bactrim as quinolone is not an option.  Repeat blood cultures are negative.  Patient could be discharged on oral Bactrim for 10 days for bacteremic E. coli pyelonephritis of the right kidney.  Side effects of Bactrim explained to the patient including education about getting her BMP checked on Wednesday or Thursday by her primary care provider and avoid eating too much bananas or drinking too much orange juice while taking Bactrim.  Discussed with Dr. Griffin about need for outpatient follow-up with urology service.  Discussed with patient's caring nurse.  Pepe Tena MD  2024  08:49 EST    Parts of this note may be an electronic transcription/translation of spoken language to printed text using the Dragon dictation system.      Electronically signed by Pepe Tena MD at 24 0949       Isaías Griffin MD at 12/15/24 1325              Name: Nkechi Saavedra ADMIT: 2024   : 1995  PCP: Michelle Montes APRN    MRN: [id number] LOS: 1 days   AGE/SEX: 29 y.o. female  ROOM: [facility]     Subjective   Subjective   Right flank pain is better, fever curve is improving.  No nausea no vomiting.  No fevers no chills.    Review of Systems   As above  Objective   Objective   Vital Signs  Temp:  [97.5 °F (36.4 °C)-100.4 °F (38 °C)] 97.5 °F (36.4 °C)  Heart Rate:  [] 88  Resp:  [16-18] 18  BP: (105-135)/(56-76) 114/70  SpO2:  [94 %-98 %] 95 %  on   ;   Device (Oxygen Therapy): room air  Body mass index is  "36.97 kg/m².  Physical Exam    General: Alert, laying in bed, not in distress,  HEENT: Normocephalic, atraumatic  CV: Regular rate and rhythm, no murmurs rubs or gallops  Lungs: Clear to auscultation bilaterally, no crackles or wheezes  Abdomen: Soft, + right flank TTP  Extremities: No significant peripheral edema , no cyanosis     Results Review     I reviewed the patient's new clinical results.  Results from last 7 days   Lab Units 12/15/24  0408 12/14/24  0433 12/13/24  2043   WBC 10*3/mm3 13.43* 11.57* 11.55*   HEMOGLOBIN g/dL 11.3* 12.9 14.3   PLATELETS 10*3/mm3 247 298 334     Results from last 7 days   Lab Units 12/15/24  0408 12/14/24  0433 12/13/24  2043   SODIUM mmol/L 139 140 135*   POTASSIUM mmol/L 3.9 4.1 4.0   CHLORIDE mmol/L 106 105 100   CO2 mmol/L 23.0 26.6 22.9   BUN mg/dL 8 8 7   CREATININE mg/dL 0.70 0.72 0.76   GLUCOSE mg/dL 100* 103* 92   Estimated Creatinine Clearance: 132.1 mL/min (by C-G formula based on SCr of 0.7 mg/dL).  Results from last 7 days   Lab Units 12/15/24  0408 12/13/24  2043   ALBUMIN g/dL 3.5 4.6   BILIRUBIN mg/dL 0.2 0.5   ALK PHOS U/L 63 88   AST (SGOT) U/L 16 26   ALT (SGPT) U/L 26 40*     Results from last 7 days   Lab Units 12/15/24  0408 12/14/24  0433 12/13/24  2043   CALCIUM mg/dL 8.3* 9.2 9.6   ALBUMIN g/dL 3.5  --  4.6   MAGNESIUM mg/dL 2.3  --   --    PHOSPHORUS mg/dL 3.2  --   --      Results from last 7 days   Lab Units 12/13/24  2043   LACTATE mmol/L 1.9   No results found for: \"COVID19\"  No results found for: \"HGBA1C\", \"POCGLU\"        CT Abdomen Pelvis Without Contrast  Narrative: Patient: VERN WOLFF  Time Out: 23:00  Exam(s): CT ABDOMEN + PELVIS Without Contrast     EXAM:    CT Abdomen and Pelvis Without Intravenous Contrast    CLINICAL HISTORY:     Reason for exam: flank pain.    TECHNIQUE:    Axial computed tomography images of the abdomen and pelvis without   intravenous contrast.  CTDI is 13.94 mGy and DLP is 740.9 mGy-cm.  This   CT exam " was performed according to the principle of ALARA (As Low As   Reasonably Achievable) by using one or more of the following dose   reduction techniques: automated exposure control, adjustment of the mA   and or kV according to patient size, and or use of iterative   reconstruction technique.    COMPARISON:    No relevant prior studies available.    FINDINGS:    Lung bases:  Unremarkable.  No mass.  No consolidation.     ABDOMEN:    Liver:  Unremarkable.    Gallbladder and bile ducts:  Unremarkable.  No calcified stones.  No   ductal dilation.    Pancreas:  Unremarkable.  No ductal dilation.    Spleen:  Unremarkable.  No splenomegaly.    Adrenals:  Unremarkable.  No mass.    Kidneys and ureters:  Unremarkable.  No hydronephrosis, nephrolithiasis,   or obstructive uropathy.    Stomach and bowel:  Unremarkable.  No obstruction.  No mucosal   thickening.     PELVIS:    Appendix:  No findings to suggest acute appendicitis.    Bladder:  Unremarkable.  No stones.    Reproductive:  Unremarkable as visualized.     ABDOMEN and PELVIS:    Intraperitoneal space:  Unremarkable.  No free air.  No significant   fluid collection.    Bones joints:  No acute fracture.  No dislocation.    Soft tissues:  Unremarkable.    Vasculature:  Unremarkable.  No abdominal aortic aneurysm.    Lymph nodes:  Unremarkable.  No enlarged lymph nodes.    IMPRESSION:         No hydronephrosis, nephrolithiasis, or obstructive uropathy.  Impression: Electronically signed by Clinton Edwards MD on 12-13-24 at 2300    Scheduled Medications  cefTRIAXone, 2,000 mg, Intravenous, Q24H  sodium chloride, 10 mL, Intravenous, Q12H  [START ON 12/16/2024] vitamin D, 50,000 Units, Oral, Weekly    Infusions   Diet  Diet: Regular/House; Fluid Consistency: Thin (IDDSI 0)    I have personally reviewed     [x]  Laboratory   [x]  Microbiology   [x]  Radiology   []  EKG/Telemetry  [x]  Cardiology/Vascular   []  Pathology    []  Records      Assessment/Plan     Active Hospital  Problems    Diagnosis  POA    **Acute pyelonephritis [N10]  Unknown    Bacteremia due to Gram-negative bacteria [R78.81]  Unknown    Pyelonephritis [N12]  Yes    Abdominal pain [R10.9]  Yes      Resolved Hospital Problems   No resolved problems to display.       Patient is a 29 y.o. female with medical history significant for PCOS presents to the ED on 12/14/2024 with worsening dysuria that started 10 days ago and right-sided flank pain but started 4 days prior to admission.  Patient was initially admitted to observation unit in the ED,, initiated on IV fluids, IV ceftriaxone, urine and blood cultures were obtained.  Blood culture came back positive for E. coli, and A was consulted for assumption of care and further evaluation in the setting of positive blood cultures    Acute pyelonephritis  E. coli bacteremia  Sepsis  -Patient was tachycardic on admission, Tmax 101.1, meeting criteria for sepsis  -Urinalysis on admission was positive for nitrates, large leukocytes, RBC, WBC, 1+ bacteria.  -CT abdomen pelvis on admission with no hydronephrosis, nephrolithiasis, or obstructive uropathy, however was performed without contrast.  Clinically symptoms consistent with pyelonephritis.  Patient with tenderness to  palpation in the right flank.  -Admission blood cultures positive for E. coli, sensitivity pending  -Repeat blood cultures 12/15/2024 pending  -As needed Percocet  -Fever curve improving, WBC slightly higher today  -ID consult pending  -Continue IV ceftriaxone      SCDs for DVT prophylaxis.  Full code.  Discussed with patient.  Expected Discharge Date: 12/16/2024; Expected Discharge Time:        Copied text in this note has been reviewed and is accurate as of 12/15/24.         Dictated utilizing Dragon dictation        Isaías Griffin MD  [facility]  12/15/24  13:25 EST        Electronically signed by Isaías Griffin MD at 12/15/24 1327       Isaías Griffin MD at 12/14/24  1629              Name: Nkechi Saavedra ADMIT: 2024   : 1995  PCP: Michelle Montes APRN    MRN: [id number] LOS: 0 days   AGE/SEX: 29 y.o. female  ROOM: [facility]     Subjective   Subjective   Patient seen this morning, spouse present at bedside.  Continues to complain of right flank pain, received pain medication earlier and it seems to be helping.  Denies current nausea or vomiting.    Review of Systems   As above  Objective   Objective   Vital Signs  Temp:  [97.8 °F (36.6 °C)-101.3 °F (38.5 °C)] 98.4 °F (36.9 °C)  Heart Rate:  [] 83  Resp:  [16-20] 18  BP: (110-143)/(55-98) 135/76  SpO2:  [94 %-100 %] 97 %  on   ;   Device (Oxygen Therapy): room air  Body mass index is 36.97 kg/m².  Physical Exam    General: Alert and oriented x3, no acute distress  HEENT: Normocephalic, atraumatic  CV: Regular rate and rhythm, no murmurs rubs or gallops  Lungs: Clear to auscultation bilaterally, no crackles or wheezes  Abdomen: Soft, + right flank TTP  Extremities: No significant peripheral edema , no cyanosis     Results Review     I reviewed the patient's new clinical results.  Results from last 7 days   Lab Units 24  0433 12/13/24  2043   WBC 10*3/mm3 11.57* 11.55*   HEMOGLOBIN g/dL 12.9 14.3   PLATELETS 10*3/mm3 298 334     Results from last 7 days   Lab Units 24  0433 24  2043   SODIUM mmol/L 140 135*   POTASSIUM mmol/L 4.1 4.0   CHLORIDE mmol/L 105 100   CO2 mmol/L 26.6 22.9   BUN mg/dL 8 7   CREATININE mg/dL 0.72 0.76   GLUCOSE mg/dL 103* 92   Estimated Creatinine Clearance: 128.4 mL/min (by C-G formula based on SCr of 0.72 mg/dL).  Results from last 7 days   Lab Units 24   ALBUMIN g/dL 4.6   BILIRUBIN mg/dL 0.5   ALK PHOS U/L 88   AST (SGOT) U/L 26   ALT (SGPT) U/L 40*     Results from last 7 days   Lab Units 24  0433 24  2043   CALCIUM mg/dL 9.2 9.6   ALBUMIN g/dL  --  4.6     Results from last 7 days   Lab Units 24   LACTATE mmol/L 1.9  "  No results found for: \"COVID19\"  No results found for: \"HGBA1C\", \"POCGLU\"        CT Abdomen Pelvis Without Contrast  Narrative: Patient: VERN WOLFF  Time Out: 23:00  Exam(s): CT ABDOMEN + PELVIS Without Contrast     EXAM:    CT Abdomen and Pelvis Without Intravenous Contrast    CLINICAL HISTORY:     Reason for exam: flank pain.    TECHNIQUE:    Axial computed tomography images of the abdomen and pelvis without   intravenous contrast.  CTDI is 13.94 mGy and DLP is 740.9 mGy-cm.  This   CT exam was performed according to the principle of ALARA (As Low As   Reasonably Achievable) by using one or more of the following dose   reduction techniques: automated exposure control, adjustment of the mA   and or kV according to patient size, and or use of iterative   reconstruction technique.    COMPARISON:    No relevant prior studies available.    FINDINGS:    Lung bases:  Unremarkable.  No mass.  No consolidation.     ABDOMEN:    Liver:  Unremarkable.    Gallbladder and bile ducts:  Unremarkable.  No calcified stones.  No   ductal dilation.    Pancreas:  Unremarkable.  No ductal dilation.    Spleen:  Unremarkable.  No splenomegaly.    Adrenals:  Unremarkable.  No mass.    Kidneys and ureters:  Unremarkable.  No hydronephrosis, nephrolithiasis,   or obstructive uropathy.    Stomach and bowel:  Unremarkable.  No obstruction.  No mucosal   thickening.     PELVIS:    Appendix:  No findings to suggest acute appendicitis.    Bladder:  Unremarkable.  No stones.    Reproductive:  Unremarkable as visualized.     ABDOMEN and PELVIS:    Intraperitoneal space:  Unremarkable.  No free air.  No significant   fluid collection.    Bones joints:  No acute fracture.  No dislocation.    Soft tissues:  Unremarkable.    Vasculature:  Unremarkable.  No abdominal aortic aneurysm.    Lymph nodes:  Unremarkable.  No enlarged lymph nodes.    IMPRESSION:         No hydronephrosis, nephrolithiasis, or obstructive uropathy.  Impression: " Electronically signed by Clinton Edwards MD on 12-13-24 at 2300    Scheduled Medications  cefTRIAXone, 2,000 mg, Intravenous, Q24H  sodium chloride, 10 mL, Intravenous, Q12H  [START ON 12/16/2024] vitamin D, 50,000 Units, Oral, Weekly    Infusions   Diet  Diet: Regular/House; Fluid Consistency: Thin (IDDSI 0)    I have personally reviewed     [x]  Laboratory   [x]  Microbiology   [x]  Radiology   [x]  EKG/Telemetry  [x]  Cardiology/Vascular   []  Pathology    []  Records      Assessment/Plan     Active Hospital Problems    Diagnosis  POA    **Acute pyelonephritis [N10]  Unknown    Bacteremia due to Gram-negative bacteria [R78.81]  Unknown    Pyelonephritis [N12]  Yes    Abdominal pain [R10.9]  Yes      Resolved Hospital Problems   No resolved problems to display.       Patient is a 29 y.o. female with medical history significant for PCOS presents to the ED on 12/14/2024 with worsening dysuria that started 10 days ago and right-sided flank pain but started 4 days prior to admission.  Patient was initially admitted to observation unit in the ED,, initiated on IV fluids, IV ceftriaxone, urine and blood cultures were obtained.  Blood culture came back positive for E. coli, and A was consulted for assumption of care and further evaluation in the setting of positive blood cultures    Acute pyelonephritis  E. coli bacteremia  Sepsis  -Patient was tachycardic on admission, Tmax 101.1, meeting criteria for sepsis  -Urinalysis on admission was positive for nitrates, large leukocytes, RBC, WBC, 1+ bacteria.  -CT abdomen pelvis on admission with no hydronephrosis, nephrolithiasis, or obstructive uropathy, however was performed without contrast.  Clinically symptoms consistent with pyelonephritis.  Patient with tenderness to  palpation in the right flank.  -Blood cultures positive for E. coli, sensitivity pending  -Initiate as needed Percocet 5 mg every 6 hours for pain  -ID consult      SCDs for DVT prophylaxis.  Full  code.  Discussed with patient.  Expected Discharge Date: 2024; Expected Discharge Time:        Copied text in this note has been reviewed and is accurate as of 24.         Dictated utilizing Dragon dictation        Isaías Griffin MD  [facility]  24  16:29 EST        Electronically signed by Isaías Griffin MD at 24 1703          Consult Notes (last 72 hours)        Pepe Tena MD at 12/15/24 0727        Consult Orders    1. Inpatient Infectious Diseases Consult [[id number]] ordered by Isaías Griffin MD at 24 1119                 CONSULT NOTE    Infectious Diseases - Pepe Tena. MD  [facility]       Patient Identification:  Name: Nkechi Saavedra  Age: 29 y.o.  Sex: female  :  1995  MRN: [id number]             Date of Consultation: 12/15/2024      Primary Care Physician: Michelle Montes APRN                               Requesting Physician:   Reason for Consultation: Bacteremia    History of presenting illness: Patient is a 29-year-old female with history of recurrent urinary tract infections and has had multiple UTIs as a child and was doing fine until last year when she had 4 episodes of urinary tract infection on a monthly basis from September through December was in her usual state of her health until 10 days prior to this admission when she started having burning in urination for 10 days and started to have right-sided flank pain 4 days prior to coming to the emergency room on 2024.  Patient was diagnosed with pyelonephritis and UTI and was started on IV antibiotic therapy after blood cultures were drawn and admitted to obs unit.  Her blood culture come back positive for gram-negative theresa resulting in admission to be inpatient admission.  Over the course of last 48 hours her right-sided flank pain is much improved but is still there and her dysuria is much improved.  Infectious disease  service is consulted to comment on antibiotic therapy for this bacteremic pyelonephritis.  Patient has underlying history of polycystic ovarian syndrome hypothyroidism's disease.  Impression:  This presentation in the above context is consistent with:  1-E. coli bacteremic pyelonephritis  2-history of recurrent UTI suggestive of structural or functional  tract abnormalities though not clearly reported on the CT scan of the abdomen and pelvis performed at the time of admission on 12/13/2024  3-history of PCOS  4-other diagnoses per primary team.  Recommendations/Discussions:  At this juncture I agree with the care plan consisting of IV Rocephin at 2 g every 24 and repeating blood cultures.  Follow-up on the clinical course and sensitivity of the E. coli to decide upon antibiotic therapy.  Patient's allergy profile is such that options for out of hospital antibiotic therapy are either:  Continuation of IV Rocephin for total of 10 to 14 days versus  If pathogen is sensitive to Bactrim then Bactrim DS 1 tablet p.o. twice daily for 10 to 14 days with close monitoring of renal function, potassium level and keeping an eye on rash and fever that could occur from the Bactrim use with frequent point BMPs while on Bactrim, or  If pathogen is sensitive to quinolone class of antibiotics and Cipro Levaquin will be another option as oral antibiotic therapy if patient is agreeable to take quinolone after risks clearly knowing potential FDA warnings such as possibility of ligament tendon injury, neuropathy, QTc prolongation potential, neuropsychiatric symptoms etc.  Also patient is informed of the alternate choices to quinolones.  Patient would also require out of hospital follow-up with urology service to make sure that she does not have vesicoureteric reflux disease putting her at high risk of recurrent urinary tract infection and strategies to prevent future episodes of UTI.  Side effects of antibiotic therapy in general  discussed with the patient.  Thank you very much for letting me be the part of your patient care please see above impression and recommendations              Past Medical History:  Past Medical History:   Diagnosis Date    Abnormal ultrasound of thyroid gland 02/22/2023    Fatty liver     Frequent UTI     History of peritonitis     childhood    Polycystic ovary syndrome     Polyp, corpus uteri     Thyroid disorder     Thyroid nodule 02/22/2023     Past Surgical History:  Past Surgical History:   Procedure Laterality Date    APPENDECTOMY      age 4    D & C HYSTEROSCOPY N/A 01/23/2023    Procedure: DILATATION AND CURETTAGE HYSTEROSCOPY WITH MYOSURE;  Surgeon: Fabricio Barrera MD;  Location: [facility] OR;  Service: Obstetrics/Gynecology;  Laterality: N/A;    DIAGNOSTIC LAPAROSCOPY      for peritonitis in childhood      Home Meds:  Medications Prior to Admission   Medication Sig Dispense Refill Last Dose/Taking    naproxen (NAPROSYN) 500 MG tablet Take 1 tablet by mouth 2 (Two) Times a Day As Needed for Mild Pain. 30 tablet 0 12/13/2024    SUMAtriptan (Imitrex) 100 MG tablet Take one tablet at onset of headache. May repeat dose one time in 2 hours if headache not relieved. 6 tablet 5 Taking    vitamin D (ERGOCALCIFEROL) 1.25 MG (30271 UT) capsule capsule Take 1 capsule by mouth 1 (One) Time Per Week. 12 capsule 0 Taking     Current Meds:     Current Facility-Administered Medications:     acetaminophen (TYLENOL) tablet 650 mg, 650 mg, Oral, Q6H PRN, Adolfo Singh APRN, 650 mg at 12/14/24 2346    sennosides-docusate (PERICOLACE) 8.6-50 MG per tablet 2 tablet, 2 tablet, Oral, BID PRN **AND** polyethylene glycol (MIRALAX) packet 17 g, 17 g, Oral, Daily PRN **AND** bisacodyl (DULCOLAX) EC tablet 5 mg, 5 mg, Oral, Daily PRN **AND** bisacodyl (DULCOLAX) suppository 10 mg, 10 mg, Rectal, Daily PRN, Adolfo Singh, APRN    Calcium Replacement - Follow Nurse / BPA Driven Protocol, , Not Applicable, PRN, Adolfo Singh,  APRN    cefTRIAXone (ROCEPHIN) 2,000 mg in sodium chloride 0.9 % 100 mL MBP, 2,000 mg, Intravenous, Q24H, Adolfo Singh APRN, Stopped at 12/14/24 1742    Magnesium Standard Dose Replacement - Follow Nurse / BPA Driven Protocol, , Not Applicable, PRN, Adolfo Singh, APRCHILO    ondansetron (ZOFRAN) injection 4 mg, 4 mg, Intravenous, Q6H PRN, Adolfo Singh APRN, 4 mg at 12/14/24 2347    oxyCODONE-acetaminophen (PERCOCET) 5-325 MG per tablet 1 tablet, 1 tablet, Oral, Q6H PRN, Isaías Griffin MD    phenazopyridine (PYRIDIUM) tablet 100 mg, 100 mg, Oral, TID PRN, Adolfo Singh APRN    Phosphorus Replacement - Follow Nurse / BPA Driven Protocol, , Not Applicable, PRN, Adolfo Singh, APRN    Potassium Replacement - Follow Nurse / BPA Driven Protocol, , Not Applicable, PRN, Adolfo Singh APRN    sodium chloride 0.9 % flush 10 mL, 10 mL, Intravenous, PRN, Adolfo Singh, MIAH    sodium chloride 0.9 % flush 10 mL, 10 mL, Intravenous, Q12H, Adolfo Singh APRN, 10 mL at 12/14/24 2218    sodium chloride 0.9 % flush 10 mL, 10 mL, Intravenous, PRN, Adolfo Singh, MIAH    sodium chloride 0.9 % flush 10 mL, 10 mL, Intravenous, PRN, Anshu Barton MD    sodium chloride 0.9 % infusion 40 mL, 40 mL, Intravenous, PRN, Adolfo Singh APRN    [START ON 12/16/2024] vitamin D (ERGOCALCIFEROL) capsule 50,000 Units, 50,000 Units, Oral, Weekly, Adolfo Singh APRN  Allergies:  Allergies   Allergen Reactions    Provera [Medroxyprogesterone] Other (See Comments)     Suicidal Thoughts./ depression    Metformin GI Intolerance     Social History:   Social History     Tobacco Use    Smoking status: Never     Passive exposure: Never    Smokeless tobacco: Never   Substance Use Topics    Alcohol use: Not Currently      Family History:  Family History   Problem Relation Age of Onset    Diabetes Sister     Malig Hyperthermia Neg Hx     Breast cancer Neg Hx     Ovarian cancer Neg Hx     Uterine cancer Neg Hx     Colon cancer Neg Hx           Review of Systems  See  "history of present illness and past medical history.  Patient denies headache, dizziness, syncope, falls, trauma, change in vision, change in hearing, change in taste, changes in weight, changes in appetite, focal weakness, numbness, or paresthesia.  Patient denies chest pain, palpitations, dyspnea, orthopnea, PND, cough, sinus pressure, rhinorrhea, epistaxis, hemoptysis, nausea, vomiting,hematemesis, diarrhea, constipation or hematchezia.  Denies cold or heat intolerance, polydipsia, polyuria, polyphagia. Denies hematuria, pyuria, dysuria, hesitancy, frequency or urgency. Denies consumption of raw and under cooked meats foods or change in water source.  Denies fever, chills, sweats, night sweats.  Denies missing any routine medications. Remainder of ROS is negative.      Vitals:   /56 (BP Location: Left arm, Patient Position: Lying)   Pulse 104   Temp 99 °F (37.2 °C) (Oral)   Resp 18   Ht 160 cm (63\")   Wt 94.7 kg (208 lb 11.2 oz)   LMP 07/01/2024   SpO2 96%   BMI 36.97 kg/m²   I/O:   Intake/Output Summary (Last 24 hours) at 12/15/2024 0728  Last data filed at 12/14/2024 2018  Gross per 24 hour   Intake 520 ml   Output --   Net 520 ml     Exam:  Patient is examined using the personal protective equipment as per guidelines from infection control for this particular patient as enacted.  Hand washing was performed before and after patient interaction.  General Appearance:    Alert, cooperative, no distress, appears stated age   Head:    Normocephalic, without obvious abnormality, atraumatic   Eyes:    PERRL, conjunctivae/corneas clear, EOM's intact, both eyes   Ears:    Normal external ear canals, both ears   Nose:   Nares normal, septum midline, mucosa normal, no drainage    or sinus tenderness   Throat:   Lips, tongue, gums normal; oral mucosa pink and moist   Neck:   Supple, symmetrical, trachea midline, no adenopathy;     thyroid:  no enlargement/tenderness/nodules; no carotid    bruit or JVD   Back: "     Symmetric, no curvature, ROM normal, no CVA tenderness   Lungs:     Clear to auscultation bilaterally, respirations unlabored   Chest Wall:    No tenderness or deformity    Heart:    Regular rate and rhythm, S1 and S2 normal, no murmur,    Abdomen:     Soft, non-tender, bowel sounds active all four quadrants,     no masses, no hepatomegaly, no splenomegaly   Extremities:   Extremities normal, atraumatic, no cyanosis or edema   Pulses:   Pulses palpable in all extremities; symmetric all extremities   Skin:   Skin color normal, Skin is warm and dry,  no rashes or palpable lesions   Neurologic:   CNII-XII intact, motor strength grossly intact, sensation grossly intact to light touch, no focal deficits noted       Data Review:    I reviewed the patient's new clinical results.  Results from last 7 days   Lab Units 12/15/24  0408 12/14/24  0433 12/13/24  2043   WBC 10*3/mm3 13.43* 11.57* 11.55*   HEMOGLOBIN g/dL 11.3* 12.9 14.3   PLATELETS 10*3/mm3 247 298 334     Results from last 7 days   Lab Units 12/15/24  0408 12/14/24 0433 12/13/24  2043   SODIUM mmol/L 139 140 135*   POTASSIUM mmol/L 3.9 4.1 4.0   CHLORIDE mmol/L 106 105 100   CO2 mmol/L 23.0 26.6 22.9   BUN mg/dL 8 8 7   CREATININE mg/dL 0.70 0.72 0.76   CALCIUM mg/dL 8.3* 9.2 9.6   GLUCOSE mg/dL 100* 103* 92     Microbiology Results (last 10 days)       Procedure Component Value - Date/Time    Gastrointestinal Panel, PCR - Stool, Per Rectum [[id number]]  (Normal) Collected: 12/14/24 1032    Lab Status: Final result Specimen: Stool from Per Rectum Updated: 12/14/24 1237     Campylobacter Not Detected     Plesiomonas shigelloides Not Detected     Salmonella Not Detected     Vibrio Not Detected     Vibrio cholerae Not Detected     Yersinia enterocolitica Not Detected     Enteroaggregative E. coli (EAEC) Not Detected     Enteropathogenic E. coli (EPEC) Not Detected     Enterotoxigenic E. coli (ETEC) lt/st Not Detected     Shiga-like toxin-producing E. coli (STEC)  stx1/stx2 Not Detected     Shigella/Enteroinvasive E. coli (EIEC) Not Detected     Cryptosporidium Not Detected     Cyclospora cayetanensis Not Detected     Entamoeba histolytica Not Detected     Giardia lamblia Not Detected     Adenovirus F40/41 Not Detected     Astrovirus Not Detected     Norovirus GI/GII Not Detected     Rotavirus A Not Detected     Sapovirus (I, II, IV or V) Not Detected    Narrative:      If Aeromonas, Staphylococcus aureus or Bacillus cereus are suspected, please order [id number]: Stool Culture, Aeromonas, S aureus, B Cereus.    Blood Culture - Blood, Arm, Left [[id number]]  (Abnormal) Collected: 12/13/24 2138    Lab Status: Preliminary result Specimen: Blood from Arm, Left Updated: 12/15/24 0657     Blood Culture Escherichia coli     Isolated from Aerobic Bottle     Gram Stain Aerobic Bottle Gram negative bacilli    Narrative:      Less than seven (7) mL's of blood was collected.  Insufficient quantity may yield false negative results.    Blood Culture ID, PCR - Blood, Arm, Left [[id number]]  (Abnormal) Collected: 12/13/24 2138    Lab Status: Final result Specimen: Blood from Arm, Left Updated: 12/14/24 1155     BCID, PCR Escherichia coli. Identification by BCID2 PCR.     BOTTLE TYPE Aerobic Bottle    Narrative:      No resistance genes detected.    Blood Culture - Blood, Arm, Left [[id number]]  (Normal) Collected: 12/13/24 2043    Lab Status: Preliminary result Specimen: Blood from Arm, Left Updated: 12/14/24 2102     Blood Culture No growth at 24 hours              Assessment:  Active Hospital Problems    Diagnosis  POA    **Acute pyelonephritis [N10]  Unknown    Bacteremia due to Gram-negative bacteria [R78.81]  Unknown    Pyelonephritis [N12]  Yes    Abdominal pain [R10.9]  Yes      Resolved Hospital Problems   No resolved problems to display.         Plan:  See above  Pepe Tena MD   12/15/2024  07:28 EST    Parts of this note may be an electronic transcription/translation of spoken language  to printed text using the Dragon dictation system.      Electronically signed by Pepe Tena MD at 24 0714          Discharge Summary        Isaías Griffin MD at 24 0904              Patient Name: Nkechi Saavedra  : 1995  MRN: [id number]    Date of Admission: 2024  Date of Discharge:  2024  Primary Care Physician: Michelle Montes APRN      Chief Complaint:   Abdominal Pain      Discharge Diagnoses     Active Hospital Problems    Diagnosis  POA    **Acute pyelonephritis [N10]  Unknown    E coli bacteremia [R78.81, B96.20]  Yes    Bacteremia due to Gram-negative bacteria [R78.81]  Unknown    Pyelonephritis [N12]  Yes    Abdominal pain [R10.9]  Yes      Resolved Hospital Problems   No resolved problems to display.        Hospital Course     Patient is a 29 y.o. female with medical history significant for PCOS presents, prior pyelonephritis, presented to the ED on 2024 with worsening dysuria that started 10 days ago and right-sided flank pain but started 4 days prior to admission.  Patient was initially admitted to observation unit in the ED,, initiated on IV fluids, IV ceftriaxone, urine and blood cultures were obtained.  Blood culture came back positive for E. coli, and [facility] was consulted for assumption of care and further evaluation in the setting of positive blood cultures.  IV ceftriaxone and pain management was continued, ID was consulted, and repeat blood cultures were obtained.  Patient clinically improved, WBC peaked at 13.43, and normalized prior to discharge.  Repeat blood culture from 12/15/2024 remained negative to date.  Fever resolved, still had mild pain but significantly improved compared to admission.  Patient was discharged on Bactrim for 10 more days per infectious disease recommendations.  In the setting of recurrent pyelonephritis, concerning for vesicoureteral reflux and patient needs evaluation by urology per infectious disease  recommendations.  Discussed with patient.  Placed referral to urology at discharge and information to call as well.        Discussed with patient  Discussed with ID    At the time of discharge patient was told to take all medications as prescribed, keep all follow-up appointments, and call their doctor or return to the hospital with any worsening or concerning symptoms.              Day of Discharge     Subjective:  Patient seen this morning, no acute events overnight.  Reports pain is improved, mild, 2 out of 10.  No nausea no vomiting, no fevers no chills.    Physical Exam:  Temp:  [97.5 °F (36.4 °C)-98.7 °F (37.1 °C)] 97.9 °F (36.6 °C)  Heart Rate:  [83-90] 88  Resp:  [16-18] 18  BP: (113-123)/(70-86) 123/78  Body mass index is 36.97 kg/m².  Physical Exam    General: Alert, laying in bed, not in distress,  HEENT: Normocephalic, atraumatic  CV: Regular rate and rhythm, no murmurs rubs or gallops  Lungs: Clear to auscultation bilaterally, no crackles or wheezes  Abdomen: Soft, mild TTP right flank, nondistended  Extremities: No significant peripheral edema , no cyanosis     Consultants     Consult Orders (all) (From admission, onward)       Start     Ordered    12/14/24 1120  Inpatient Infectious Diseases Consult  Once        Specialty:  Infectious Diseases  Provider:  Pepe Tena MD    12/14/24 1119    12/14/24 1004  Inpatient Hospitalist Consult  Once        Specialty:  Hospitalist  Provider:  Hakan Mora MD    12/14/24 1004                  Procedures     * Surgery not found *      Imaging Results (All)       Procedure Component Value Units Date/Time    CT Abdomen Pelvis Without Contrast [[id number]] Collected: 12/13/24 2301     Updated: 12/13/24 2301    Narrative:        Patient: VERN WOLFF  Time Out: 23:00  Exam(s): CT ABDOMEN + PELVIS Without Contrast     EXAM:    CT Abdomen and Pelvis Without Intravenous Contrast    CLINICAL HISTORY:     Reason for exam: flank pain.    TECHNIQUE:    Axial  computed tomography images of the abdomen and pelvis without   intravenous contrast.  CTDI is 13.94 mGy and DLP is 740.9 mGy-cm.  This   CT exam was performed according to the principle of ALARA (As Low As   Reasonably Achievable) by using one or more of the following dose   reduction techniques: automated exposure control, adjustment of the mA   and or kV according to patient size, and or use of iterative   reconstruction technique.    COMPARISON:    No relevant prior studies available.    FINDINGS:    Lung bases:  Unremarkable.  No mass.  No consolidation.     ABDOMEN:    Liver:  Unremarkable.    Gallbladder and bile ducts:  Unremarkable.  No calcified stones.  No   ductal dilation.    Pancreas:  Unremarkable.  No ductal dilation.    Spleen:  Unremarkable.  No splenomegaly.    Adrenals:  Unremarkable.  No mass.    Kidneys and ureters:  Unremarkable.  No hydronephrosis, nephrolithiasis,   or obstructive uropathy.    Stomach and bowel:  Unremarkable.  No obstruction.  No mucosal   thickening.     PELVIS:    Appendix:  No findings to suggest acute appendicitis.    Bladder:  Unremarkable.  No stones.    Reproductive:  Unremarkable as visualized.     ABDOMEN and PELVIS:    Intraperitoneal space:  Unremarkable.  No free air.  No significant   fluid collection.    Bones joints:  No acute fracture.  No dislocation.    Soft tissues:  Unremarkable.    Vasculature:  Unremarkable.  No abdominal aortic aneurysm.    Lymph nodes:  Unremarkable.  No enlarged lymph nodes.    IMPRESSION:         No hydronephrosis, nephrolithiasis, or obstructive uropathy.      Impression:          Electronically signed by Clinton Edwards MD on 12-13-24 at 2300              Pertinent Labs     Results from last 7 days   Lab Units 12/16/24  0531 12/15/24  0408 12/14/24  0433 12/13/24  2043   WBC 10*3/mm3 10.47 13.43* 11.57* 11.55*   HEMOGLOBIN g/dL 11.3* 11.3* 12.9 14.3   PLATELETS 10*3/mm3 255 247 298 334     Results from last 7 days   Lab Units  "12/16/24  0531 12/15/24  0408 12/14/24 0433 12/13/24 2043   SODIUM mmol/L 135* 139 140 135*   POTASSIUM mmol/L 4.3 3.9 4.1 4.0   CHLORIDE mmol/L 102 106 105 100   CO2 mmol/L 24.2 23.0 26.6 22.9   BUN mg/dL 8 8 8 7   CREATININE mg/dL 0.75 0.70 0.72 0.76   GLUCOSE mg/dL 95 100* 103* 92   Estimated Creatinine Clearance: 123.3 mL/min (by C-G formula based on SCr of 0.75 mg/dL).  Results from last 7 days   Lab Units 12/16/24  0531 12/15/24  0408 12/13/24  2043   ALBUMIN g/dL 3.6 3.5 4.6   BILIRUBIN mg/dL <0.2 0.2 0.5   ALK PHOS U/L 65 63 88   AST (SGOT) U/L 30 16 26   ALT (SGPT) U/L 37* 26 40*     Results from last 7 days   Lab Units 12/16/24  0531 12/15/24  0408 12/14/24 0433 12/13/24 2043   CALCIUM mg/dL 8.8 8.3* 9.2 9.6   ALBUMIN g/dL 3.6 3.5  --  4.6   MAGNESIUM mg/dL  --  2.3  --   --    PHOSPHORUS mg/dL  --  3.2  --   --                Invalid input(s): \"LDLCALC\"  Results from last 7 days   Lab Units 12/15/24  0759 12/13/24 2138 12/13/24 2121 12/13/24 2043   BLOODCX  No growth at 24 hours Escherichia coli*  --  No growth at 2 days   URINECX   --   --  >100,000 CFU/mL Escherichia coli*  --    BCIDPCR   --  Escherichia coli. Identification by BCID2 PCR.*  --   --          Test Results Pending at Discharge     Pending Labs       Order Current Status    Blood Culture - Blood, Arm, Right In process    Blood Culture - Blood, Arm, Left Preliminary result    Blood Culture - Blood, Hand, Left Preliminary result    Urine Culture - Urine, Urine, Clean Catch Preliminary result            Discharge Details        Discharge Medications        New Medications        Instructions Start Date   acetaminophen 325 MG tablet  Commonly known as: TYLENOL   650 mg, Oral, Every 6 Hours PRN      sulfamethoxazole-trimethoprim 800-160 MG per tablet  Commonly known as: BACTRIM DS,SEPTRA DS   1 tablet, Oral, Every 12 Hours Scheduled             Continue These Medications        Instructions Start Date   SUMAtriptan 100 MG " tablet  Commonly known as: Imitrex   Take one tablet at onset of headache. May repeat dose one time in 2 hours if headache not relieved.      vitamin D 1.25 MG (74852 UT) capsule capsule  Commonly known as: ERGOCALCIFEROL   50,000 Units, Oral, Weekly             Stop These Medications      naproxen 500 MG tablet  Commonly known as: NAPROSYN              Allergies   Allergen Reactions    Provera [Medroxyprogesterone] Other (See Comments)     Suicidal Thoughts./ depression    Metformin GI Intolerance       Discharge Disposition:  Home or Self Care      Discharge Diet:  Diet Order   Procedures    Diet: Regular/House; Fluid Consistency: Thin (IDDSI 0)       Discharge Activity:       CODE STATUS:    Code Status and Medical Interventions: CPR (Attempt to Resuscitate); Full Support   Ordered at: 12/13/24 1160     Level Of Support Discussed With:    Patient     Code Status (Patient has no pulse and is not breathing):    CPR (Attempt to Resuscitate)     Medical Interventions (Patient has pulse or is breathing):    Full Support       Future Appointments   Date Time Provider Department Center   7/22/2025 10:00 AM Fabricio Barrera MD [facility]      Follow-up Information       Michelle Montes APRN. Schedule an appointment as soon as possible for a visit in 5 day(s).    Specialty: Nurse Practitioner  Contact information:  [address] [id number] [phone]               [facility]. Schedule an appointment as soon as possible for a visit.    Contact information:  [address] [facility] [address] [phone]                           Time Spent on Discharge:  Greater than 30 minutes      Isaías Griffin MD  [facility]  12/16/24  09:04 EST                Electronically signed by Isaías Griffin MD at 12/16/24 0979

## 2024-12-17 NOTE — OUTREACH NOTE
Call Center TCM Note      Flowsheet Row Responses   [facility] patient discharged from? [place]   Does the patient have one of the following disease processes/diagnoses(primary or secondary)? Other   TCM attempt successful? Yes   Call start time 1456   Call end time 1507   Discharge diagnosis Acute pyelonephritis   If primary language is not English what is the name and relationship or agency of  used? [employer]   Meds reviewed with patient/caregiver? Yes   Is the patient having any side effects they believe may be caused by any medication additions or changes? No   Does the patient have all medications ordered at discharge? Yes   Prescription comments New rx Bactrim DS in place   Is the patient taking all medications as directed (includes completed medication regime)? Yes   Does the patient have an appointment with their PCP within 7-14 days of discharge? No appointments available   Nursing Interventions Routed TCM call to PCP office   Has home health visited the patient within 72 hours of discharge? N/A   Psychosocial issues? No   Did the patient receive a copy of their discharge instructions? Yes   Nursing interventions Reviewed instructions with patient   What is the patient's perception of their health status since discharge? Same   Is the patient/caregiver able to teach back signs and symptoms related to disease process for when to call PCP? Yes   Is the patient/caregiver able to teach back signs and symptoms related to disease process for when to call 911? Yes   Is the patient/caregiver able to teach back the hierarchy of who to call/visit for symptoms/problems? PCP, Specialist, Home health nurse, Urgent Care, ED, 911 Yes   If the patient is a current smoker, are they able to teach back resources for cessation? Not a smoker   TCM call completed? Yes   Wrap up additional comments D/C DX: Acute pyelonephritis - Pt continues to feel poorly, chills, dysuria, just unwell. Pt  is on prescribed Bactrim DS. OF NOTE: A 5 day PCP hosp follow up was recommended at discharge and pt needs follow up labs also, but her Hosp Follow up with PCP office is not until 01/06/2025. If any provider can please see pt sooner she would be very grateful. Please call pt to discuss. FYI and  is needed for best result. Thank you   Call end time 1195            Christen Loza MA    12/17/2024, 15:10 EST

## 2024-12-17 NOTE — TELEPHONE ENCOUNTER
"Caller needs .  Caller was told at discharge she needs to be seen by PCP within 5 days for a hospital follow up.  Her provider had no openings but she did the first available with another provider in the office for Jan 6.  She also has a urology appt for that day.  Recommended calling office back to be sure she is on the wait list if there is a cancellation and if she has any worsening of symptoms to return to the ED.  Call back if further questions.  Reason for Disposition  • General information question, no triage required and triager able to answer question    Additional Information  • Negative: [1] Caller is not with the adult (patient) AND [2] reporting urgent symptoms  • Negative: Lab result questions  • Negative: Medication questions  • Negative: Caller can't be reached by phone  • Negative: Caller has already spoken to PCP or another triager  • Negative: RN needs further essential information from caller in order to complete triage  • Negative: Requesting regular office appointment  • Negative: [1] Caller requesting NON-URGENT health information AND [2] PCP's office is the best resource  • Negative: Health Information question, no triage required and triager able to answer question    Answer Assessment - Initial Assessment Questions  1. REASON FOR CALL or QUESTION: \"What is your reason for calling today?\" or \"How can I best help you?\" or \"What question do you have that I can help answer?\"      I was supposed to see my PCP within 5 days but can't be seen until Jan 6th.    Protocols used: Information Only Call - No Triage-ADULT-    "

## 2024-12-17 NOTE — OUTREACH NOTE
Prep Survey      Flowsheet Row Responses   [facility] facility patient discharged from? [place]   Is LACE score < 7 ? Yes   Eligibility [facility]   Date of Admission 12/13/24   Date of Discharge 12/16/24   Discharge Disposition Home or Self Care   Discharge diagnosis Acute pyelonephritis   Does the patient have one of the following disease processes/diagnoses(primary or secondary)? Other   Does the patient have Home health ordered? No   Is there a DME ordered? No   Prep survey completed? Yes            LEROY A - Registered Nurse

## 2024-12-18 LAB — BACTERIA SPEC AEROBE CULT: NORMAL

## 2024-12-20 LAB
BACTERIA SPEC AEROBE CULT: NORMAL
BACTERIA SPEC AEROBE CULT: NORMAL

## 2025-01-10 ENCOUNTER — OFFICE VISIT (OUTPATIENT)
Dept: FAMILY MEDICINE CLINIC | Facility: CLINIC | Age: 30
End: 2025-01-10
Payer: MEDICAID

## 2025-01-10 VITALS
BODY MASS INDEX: 37.62 KG/M2 | DIASTOLIC BLOOD PRESSURE: 82 MMHG | TEMPERATURE: 98 F | RESPIRATION RATE: 16 BRPM | OXYGEN SATURATION: 98 % | HEIGHT: 63 IN | SYSTOLIC BLOOD PRESSURE: 126 MMHG | HEART RATE: 83 BPM | WEIGHT: 212.3 LBS

## 2025-01-10 DIAGNOSIS — Z09 HOSPITAL DISCHARGE FOLLOW-UP: Primary | ICD-10-CM

## 2025-01-10 DIAGNOSIS — N12 PYELONEPHRITIS: ICD-10-CM

## 2025-01-10 DIAGNOSIS — F41.8 DEPRESSION WITH ANXIETY: ICD-10-CM

## 2025-01-10 DIAGNOSIS — K59.00 CONSTIPATION, UNSPECIFIED CONSTIPATION TYPE: ICD-10-CM

## 2025-01-10 PROBLEM — Z11.59 ENCOUNTER FOR HEPATITIS C SCREENING TEST FOR LOW RISK PATIENT: Status: RESOLVED | Noted: 2023-02-22 | Resolved: 2025-01-10

## 2025-01-10 LAB
ALBUMIN SERPL-MCNC: 4.3 G/DL (ref 3.5–5.2)
ALBUMIN/GLOB SERPL: 1.5 G/DL
ALP SERPL-CCNC: 118 U/L (ref 39–117)
ALT SERPL-CCNC: 40 U/L (ref 1–33)
AST SERPL-CCNC: 22 U/L (ref 1–32)
BASOPHILS # BLD AUTO: 0.04 10*3/MM3 (ref 0–0.2)
BASOPHILS NFR BLD AUTO: 0.4 % (ref 0–1.5)
BILIRUB BLD-MCNC: NEGATIVE MG/DL
BILIRUB SERPL-MCNC: 0.3 MG/DL (ref 0–1.2)
BUN SERPL-MCNC: 11 MG/DL (ref 6–20)
BUN/CREAT SERPL: 15.7 (ref 7–25)
CALCIUM SERPL-MCNC: 9.6 MG/DL (ref 8.6–10.5)
CHLORIDE SERPL-SCNC: 105 MMOL/L (ref 98–107)
CLARITY, POC: ABNORMAL
CO2 SERPL-SCNC: 27.7 MMOL/L (ref 22–29)
COLOR UR: ABNORMAL
CREAT SERPL-MCNC: 0.7 MG/DL (ref 0.57–1)
EGFRCR SERPLBLD CKD-EPI 2021: 121.7 ML/MIN/1.73
EOSINOPHIL # BLD AUTO: 0.54 10*3/MM3 (ref 0–0.4)
EOSINOPHIL NFR BLD AUTO: 5.7 % (ref 0.3–6.2)
ERYTHROCYTE [DISTWIDTH] IN BLOOD BY AUTOMATED COUNT: 14.3 % (ref 12.3–15.4)
EXPIRATION DATE: ABNORMAL
GLOBULIN SER CALC-MCNC: 2.9 GM/DL
GLUCOSE SERPL-MCNC: 102 MG/DL (ref 65–99)
GLUCOSE UR STRIP-MCNC: NEGATIVE MG/DL
HCT VFR BLD AUTO: 40.8 % (ref 34–46.6)
HGB BLD-MCNC: 12.6 G/DL (ref 12–15.9)
IMM GRANULOCYTES # BLD AUTO: 0.03 10*3/MM3 (ref 0–0.05)
IMM GRANULOCYTES NFR BLD AUTO: 0.3 % (ref 0–0.5)
KETONES UR QL: NEGATIVE
LEUKOCYTE EST, POC: NEGATIVE
LYMPHOCYTES # BLD AUTO: 3.61 10*3/MM3 (ref 0.7–3.1)
LYMPHOCYTES NFR BLD AUTO: 37.9 % (ref 19.6–45.3)
Lab: ABNORMAL
MCH RBC QN AUTO: 24.4 PG (ref 26.6–33)
MCHC RBC AUTO-ENTMCNC: 30.9 G/DL (ref 31.5–35.7)
MCV RBC AUTO: 79.1 FL (ref 79–97)
MONOCYTES # BLD AUTO: 0.62 10*3/MM3 (ref 0.1–0.9)
MONOCYTES NFR BLD AUTO: 6.5 % (ref 5–12)
NEUTROPHILS # BLD AUTO: 4.69 10*3/MM3 (ref 1.7–7)
NEUTROPHILS NFR BLD AUTO: 49.2 % (ref 42.7–76)
NITRITE UR-MCNC: NEGATIVE MG/ML
NRBC BLD AUTO-RTO: 0 /100 WBC (ref 0–0.2)
PH UR: 5.5 [PH] (ref 5–8)
PLATELET # BLD AUTO: 268 10*3/MM3 (ref 140–450)
POTASSIUM SERPL-SCNC: 4.7 MMOL/L (ref 3.5–5.2)
PROT SERPL-MCNC: 7.2 G/DL (ref 6–8.5)
PROT UR STRIP-MCNC: NEGATIVE MG/DL
RBC # BLD AUTO: 5.16 10*6/MM3 (ref 3.77–5.28)
RBC # UR STRIP: NEGATIVE /UL
SODIUM SERPL-SCNC: 142 MMOL/L (ref 136–145)
SP GR UR: 1.03 (ref 1–1.03)
T4 FREE SERPL-MCNC: 1.13 NG/DL (ref 0.92–1.68)
TSH SERPL DL<=0.005 MIU/L-ACNC: 1.23 UIU/ML (ref 0.27–4.2)
UROBILINOGEN UR QL: ABNORMAL
WBC # BLD AUTO: 9.53 10*3/MM3 (ref 3.4–10.8)

## 2025-01-10 RX ORDER — POLYETHYLENE GLYCOL 3350 17 G/17G
17 POWDER, FOR SOLUTION ORAL DAILY
Qty: 1500 G | Refills: 1 | Status: SHIPPED | OUTPATIENT
Start: 2025-01-10 | End: 2025-07-09

## 2025-01-10 RX ORDER — AMOXICILLIN 250 MG
1 CAPSULE ORAL 2 TIMES DAILY PRN
Qty: 60 TABLET | Refills: 5 | Status: SHIPPED | OUTPATIENT
Start: 2025-01-10 | End: 2025-07-09

## 2025-01-10 NOTE — ASSESSMENT & PLAN NOTE
Started MiraLAX and senna S as needed.    Educated patient regarding adequate intake of fluids in the form of water & fiber in the form of fruits/vegetables, patient voiced understanding.

## 2025-01-12 DIAGNOSIS — R74.8 ELEVATED LIVER ENZYMES: Primary | ICD-10-CM

## 2025-01-14 LAB
HBV CORE AB SERPL QL IA: NEGATIVE
HBV SURFACE AB SER QL: NON REACTIVE
HBV SURFACE AG SERPL QL IA: NEGATIVE
HCV IGG SERPL QL IA: NON REACTIVE
WRITTEN AUTHORIZATION: NORMAL

## 2025-02-24 ENCOUNTER — HOSPITAL ENCOUNTER (OUTPATIENT)
Facility: HOSPITAL | Age: 30
Discharge: HOME | End: 2025-02-24
Admitting: NURSE PRACTITIONER
Payer: MEDICAID

## 2025-02-24 ENCOUNTER — OFFICE VISIT (OUTPATIENT)
Dept: FAMILY MEDICINE CLINIC | Facility: CLINIC | Age: 30
End: 2025-02-24
Payer: MEDICAID

## 2025-02-24 VITALS
WEIGHT: 208 LBS | HEIGHT: 63 IN | DIASTOLIC BLOOD PRESSURE: 84 MMHG | TEMPERATURE: 98.3 F | SYSTOLIC BLOOD PRESSURE: 122 MMHG | OXYGEN SATURATION: 98 % | BODY MASS INDEX: 36.86 KG/M2 | HEART RATE: 82 BPM

## 2025-02-24 DIAGNOSIS — R06.02 SHORTNESS OF BREATH: ICD-10-CM

## 2025-02-24 DIAGNOSIS — R73.9 HYPERGLYCEMIA: ICD-10-CM

## 2025-02-24 DIAGNOSIS — Z00.00 ANNUAL PHYSICAL EXAM: Primary | ICD-10-CM

## 2025-02-24 DIAGNOSIS — R19.8 ALTERNATING CONSTIPATION AND DIARRHEA: ICD-10-CM

## 2025-02-24 DIAGNOSIS — Z00.00 ANNUAL PHYSICAL EXAM: ICD-10-CM

## 2025-02-24 DIAGNOSIS — E55.9 VITAMIN D DEFICIENCY: ICD-10-CM

## 2025-02-24 DIAGNOSIS — R35.0 URINARY FREQUENCY: ICD-10-CM

## 2025-02-24 DIAGNOSIS — E78.2 MIXED HYPERLIPIDEMIA: ICD-10-CM

## 2025-02-24 DIAGNOSIS — Z23 IMMUNIZATION DUE: ICD-10-CM

## 2025-02-24 PROCEDURE — 1160F RVW MEDS BY RX/DR IN RCRD: CPT | Performed by: NURSE PRACTITIONER

## 2025-02-24 PROCEDURE — 90471 IMMUNIZATION ADMIN: CPT | Performed by: NURSE PRACTITIONER

## 2025-02-24 PROCEDURE — 1126F AMNT PAIN NOTED NONE PRSNT: CPT | Performed by: NURSE PRACTITIONER

## 2025-02-24 PROCEDURE — 99395 PREV VISIT EST AGE 18-39: CPT | Performed by: NURSE PRACTITIONER

## 2025-02-24 PROCEDURE — 1159F MED LIST DOCD IN RCRD: CPT | Performed by: NURSE PRACTITIONER

## 2025-02-24 PROCEDURE — 71046 X-RAY EXAM CHEST 2 VIEWS: CPT

## 2025-02-24 PROCEDURE — 90656 IIV3 VACC NO PRSV 0.5 ML IM: CPT | Performed by: NURSE PRACTITIONER

## 2025-02-24 PROCEDURE — 2014F MENTAL STATUS ASSESS: CPT | Performed by: NURSE PRACTITIONER

## 2025-02-24 NOTE — PROGRESS NOTES
"Chief Complaint  Annual Exam (Pt is here for annual physical. Pt is fasting. Would like referral; to GI due to stomach issues for about 3 month)    Subjective        Nkechi Saavedra presents to [facility]  History of Present Illness  Patient presents to the office today for an annual physical exam.   Today she is here to discuss recurrent episodes of alternating constipation and diarrhea. She had recent normal CT abd/pelvis  With hyperglycemia checking A1c today.   With vitamin d deficiency checking updated level continuing vitamin D replacement.  With hyperlipidemia continue to work on healthy diet and exercise.  Patient reports when she was 7 years old was told that she had asthma.  Since then she has never had an inhaler and/or any additional testing.  Checking labs diagnostic test today.  With intermittent episodes of shortness of air checking x-ray and PFT.  O2 sats 98% on room air.  Patient advised to go to ER for chest pain shortness of air.                        Objective   Vital Signs:  /84 (BP Location: Left arm, Patient Position: Sitting, Cuff Size: Adult)   Pulse 82   Temp 98.3 °F (36.8 °C) (Temporal)   Ht 160 cm (62.99\")   Wt 94.3 kg (208 lb)   SpO2 98%   BMI 36.85 kg/m²   Estimated body mass index is 36.85 kg/m² as calculated from the following:    Height as of this encounter: 160 cm (62.99\").    Weight as of this encounter: 94.3 kg (208 lb).            Physical Exam  Constitutional:       General: She is not in acute distress.     Appearance: Normal appearance.   HENT:      Head: Normocephalic.      Right Ear: Tympanic membrane normal.      Left Ear: Tympanic membrane normal.      Nose: Nose normal.   Eyes:      Pupils: Pupils are equal, round, and reactive to light.   Cardiovascular:      Rate and Rhythm: Normal rate.      Pulses: Normal pulses.      Heart sounds: Normal heart sounds.   Pulmonary:      Effort: Pulmonary effort is normal.      " Breath sounds: Normal breath sounds.   Abdominal:      General: Bowel sounds are normal.      Palpations: Abdomen is soft.   Musculoskeletal:         General: Normal range of motion.      Cervical back: Normal range of motion and neck supple.   Skin:     General: Skin is warm.   Neurological:      General: No focal deficit present.      Mental Status: She is alert and oriented to person, place, and time.   Psychiatric:         Attention and Perception: Attention and perception normal.         Mood and Affect: Mood normal.         Behavior: Behavior normal.         Thought Content: Thought content normal. Thought content is not paranoid or delusional. Thought content does not include homicidal or suicidal ideation. Thought content does not include homicidal or suicidal plan.         Cognition and Memory: Cognition and memory normal.         Judgment: Judgment normal.        Result Review :  The following data was reviewed by: MIAH Wakefield on 02/24/2025:  Common labs          12/15/2024    04:08 12/16/2024    05:31 1/10/2025    10:44   Common Labs   Glucose 100  95  102    BUN 8  8  11    Creatinine 0.70  0.75  0.70    Sodium 139  135  142    Potassium 3.9  4.3  4.7    Chloride 106  102  105    Calcium 8.3  8.8  9.6    Albumin 3.5  3.6  4.3    Total Bilirubin 0.2  <0.2  0.3    Alkaline Phosphatase 63  65  118    AST (SGOT) 16  30  22    ALT (SGPT) 26  37  40    WBC 13.43  10.47  9.53    Hemoglobin 11.3  11.3  12.6    Hematocrit 34.8  37.8  40.8    Platelets 247  255  268      Data reviewed : Radiologic studies CT abd/pelvis           Assessment and Plan   Diagnoses and all orders for this visit:    1. Annual physical exam (Primary)  -     CBC & Differential  -     Comprehensive Metabolic Panel  -     Lipid Panel  -     TSH  -     Vitamin D,25-Hydroxy  -     Hemoglobin A1c  -     XR Chest PA & Lateral; Future    2. Vitamin D deficiency  -     Vitamin D,25-Hydroxy    3. Hyperglycemia  -     Comprehensive  Metabolic Panel  -     Hemoglobin A1c    4. Mixed hyperlipidemia  -     Lipid Panel    5. Urinary frequency  -     Urinalysis With Culture If Indicated -    6. Alternating constipation and diarrhea  -     Ambulatory Referral to Gastroenterology    7. Immunization due  -     Fluzone >6mos (2308-5153)    8. Shortness of breath  -     XR Chest PA & Lateral; Future  -     Complete PFT - Pre & Post Bronchodilator; Future      Counseling was provided on nutrition, physical activity, development, and injury prevention, dental health, and safe sex practices patient verbalizes understanding no additional questions were asked.         I spent 30 minutes caring for Nkechi on this date of service. This time includes time spent by me in the following activities:preparing for the visit, reviewing tests, obtaining and/or reviewing a separately obtained history, performing a medically appropriate examination and/or evaluation , counseling and educating the patient/family/caregiver, ordering medications, tests, or procedures, referring and communicating with other health care professionals , documenting information in the medical record, independently interpreting results and communicating that information with the patient/family/caregiver, and care coordination  Follow Up   Return in about 26 weeks (around 8/25/2025) for Recheck.  Patient was given instructions and counseling regarding her condition or for health maintenance advice. Please see specific information pulled into the AVS if appropriate.

## 2025-02-25 LAB
25(OH)D3+25(OH)D2 SERPL-MCNC: 33.7 NG/ML (ref 30–100)
ALBUMIN SERPL-MCNC: 4.6 G/DL (ref 4–5)
ALP SERPL-CCNC: 119 IU/L (ref 44–121)
ALT SERPL-CCNC: 35 IU/L (ref 0–32)
APPEARANCE UR: CLEAR
AST SERPL-CCNC: 20 IU/L (ref 0–40)
BACTERIA #/AREA URNS HPF: ABNORMAL /[HPF]
BASOPHILS # BLD AUTO: 0.1 X10E3/UL (ref 0–0.2)
BASOPHILS NFR BLD AUTO: 1 %
BILIRUB SERPL-MCNC: 0.2 MG/DL (ref 0–1.2)
BILIRUB UR QL STRIP: NEGATIVE
BUN SERPL-MCNC: 12 MG/DL (ref 6–20)
BUN/CREAT SERPL: 17 (ref 9–23)
CALCIUM SERPL-MCNC: 9.3 MG/DL (ref 8.7–10.2)
CASTS URNS QL MICRO: ABNORMAL /LPF
CHLORIDE SERPL-SCNC: 106 MMOL/L (ref 96–106)
CHOLEST SERPL-MCNC: 201 MG/DL (ref 100–199)
CO2 SERPL-SCNC: 24 MMOL/L (ref 20–29)
COLOR UR: YELLOW
CREAT SERPL-MCNC: 0.71 MG/DL (ref 0.57–1)
EGFRCR SERPLBLD CKD-EPI 2021: 119 ML/MIN/1.73
EOSINOPHIL # BLD AUTO: 0.4 X10E3/UL (ref 0–0.4)
EOSINOPHIL NFR BLD AUTO: 5 %
EPI CELLS #/AREA URNS HPF: >10 /HPF (ref 0–10)
ERYTHROCYTE [DISTWIDTH] IN BLOOD BY AUTOMATED COUNT: 14.2 % (ref 11.7–15.4)
GLOBULIN SER CALC-MCNC: 2.7 G/DL (ref 1.5–4.5)
GLUCOSE SERPL-MCNC: 92 MG/DL (ref 70–99)
GLUCOSE UR QL STRIP: NEGATIVE
HBA1C MFR BLD: 5.7 % (ref 4.8–5.6)
HCT VFR BLD AUTO: 44.2 % (ref 34–46.6)
HDLC SERPL-MCNC: 57 MG/DL
HGB BLD-MCNC: 13.9 G/DL (ref 11.1–15.9)
HGB UR QL STRIP: NEGATIVE
IMM GRANULOCYTES # BLD AUTO: 0 X10E3/UL (ref 0–0.1)
IMM GRANULOCYTES NFR BLD AUTO: 0 %
KETONES UR QL STRIP: NEGATIVE
LDLC SERPL CALC-MCNC: 129 MG/DL (ref 0–99)
LEUKOCYTE ESTERASE UR QL STRIP: NEGATIVE
LYMPHOCYTES # BLD AUTO: 3.1 X10E3/UL (ref 0.7–3.1)
LYMPHOCYTES NFR BLD AUTO: 37 %
MCH RBC QN AUTO: 25 PG (ref 26.6–33)
MCHC RBC AUTO-ENTMCNC: 31.4 G/DL (ref 31.5–35.7)
MCV RBC AUTO: 80 FL (ref 79–97)
MICRO URNS: NORMAL
MICRO URNS: NORMAL
MONOCYTES # BLD AUTO: 0.5 X10E3/UL (ref 0.1–0.9)
MONOCYTES NFR BLD AUTO: 6 %
NEUTROPHILS # BLD AUTO: 4.3 X10E3/UL (ref 1.4–7)
NEUTROPHILS NFR BLD AUTO: 51 %
NITRITE UR QL STRIP: NEGATIVE
PH UR STRIP: 6.5 [PH] (ref 5–7.5)
PLATELET # BLD AUTO: 356 X10E3/UL (ref 150–450)
POTASSIUM SERPL-SCNC: 4.6 MMOL/L (ref 3.5–5.2)
PROT SERPL-MCNC: 7.3 G/DL (ref 6–8.5)
PROT UR QL STRIP: NEGATIVE
RBC # BLD AUTO: 5.55 X10E6/UL (ref 3.77–5.28)
RBC #/AREA URNS HPF: ABNORMAL /HPF (ref 0–2)
SODIUM SERPL-SCNC: 140 MMOL/L (ref 134–144)
SP GR UR STRIP: 1.02 (ref 1–1.03)
TRIGL SERPL-MCNC: 86 MG/DL (ref 0–149)
TSH SERPL DL<=0.005 MIU/L-ACNC: 0.77 UIU/ML (ref 0.45–4.5)
URINALYSIS REFLEX: NORMAL
UROBILINOGEN UR STRIP-MCNC: 0.2 MG/DL (ref 0.2–1)
VLDLC SERPL CALC-MCNC: 15 MG/DL (ref 5–40)
WBC # BLD AUTO: 8.4 X10E3/UL (ref 3.4–10.8)
WBC #/AREA URNS HPF: ABNORMAL /HPF (ref 0–5)

## 2025-03-04 ENCOUNTER — TELEPHONE (OUTPATIENT)
Dept: GASTROENTEROLOGY | Facility: CLINIC | Age: 30
End: 2025-03-04

## 2025-03-04 NOTE — TELEPHONE ENCOUNTER
This patient requires an  for their appointment. Please see the  information below.     Caller: VERN WOODRUFF    Relationship to patient: SELF  Best call back number: [phone]   Service:IPAD  Is  needs updated in registration: YES  Date of Appointment:5/9/25  Time of Appointment:1:45PM  Additional notes:PT WILL NEED  FOR NEW PATIENT APPOINTMENT WITH [facility].

## 2025-03-19 ENCOUNTER — HOSPITAL ENCOUNTER (OUTPATIENT)
Dept: RESPIRATORY THERAPY | Facility: HOSPITAL | Age: 30
Discharge: HOME | End: 2025-03-19
Admitting: NURSE PRACTITIONER
Payer: MEDICAID

## 2025-03-19 DIAGNOSIS — R06.02 SHORTNESS OF BREATH: ICD-10-CM

## 2025-03-19 PROCEDURE — 94729 DIFFUSING CAPACITY: CPT

## 2025-03-19 PROCEDURE — 94060 EVALUATION OF WHEEZING: CPT

## 2025-03-19 PROCEDURE — 94640 AIRWAY INHALATION TREATMENT: CPT

## 2025-03-19 PROCEDURE — 94726 PLETHYSMOGRAPHY LUNG VOLUMES: CPT

## 2025-03-19 RX ORDER — ALBUTEROL SULFATE 0.83 MG/ML
2.5 SOLUTION RESPIRATORY (INHALATION) ONCE
Status: COMPLETED | OUTPATIENT
Start: 2025-03-19 | End: 2025-03-19

## 2025-03-19 RX ADMIN — ALBUTEROL SULFATE 2.5 MG: 2.5 SOLUTION RESPIRATORY (INHALATION) at 15:34

## 2025-04-15 DIAGNOSIS — E55.9 VITAMIN D DEFICIENCY: ICD-10-CM

## 2025-04-15 RX ORDER — ERGOCALCIFEROL 1.25 MG/1
50000 CAPSULE, LIQUID FILLED ORAL WEEKLY
Qty: 12 CAPSULE | Refills: 0 | Status: SHIPPED | OUTPATIENT
Start: 2025-04-15

## 2025-05-09 ENCOUNTER — TELEPHONE (OUTPATIENT)
Dept: GASTROENTEROLOGY | Facility: CLINIC | Age: 30
End: 2025-05-09
Payer: MEDICAID

## 2025-05-09 ENCOUNTER — OFFICE VISIT (OUTPATIENT)
Dept: GASTROENTEROLOGY | Facility: CLINIC | Age: 30
End: 2025-05-09
Payer: MEDICAID

## 2025-05-09 VITALS
TEMPERATURE: 97.6 F | HEIGHT: 63 IN | DIASTOLIC BLOOD PRESSURE: 77 MMHG | SYSTOLIC BLOOD PRESSURE: 107 MMHG | HEART RATE: 74 BPM | BODY MASS INDEX: 37.53 KG/M2 | WEIGHT: 211.8 LBS

## 2025-05-09 DIAGNOSIS — R19.7 DIARRHEA, UNSPECIFIED TYPE: ICD-10-CM

## 2025-05-09 DIAGNOSIS — R10.84 GENERALIZED ABDOMINAL PAIN: Primary | ICD-10-CM

## 2025-05-09 DIAGNOSIS — R11.0 NAUSEA: ICD-10-CM

## 2025-05-09 DIAGNOSIS — K62.5 RECTAL BLEEDING: ICD-10-CM

## 2025-05-09 DIAGNOSIS — R12 HEARTBURN: ICD-10-CM

## 2025-05-09 RX ORDER — FAMOTIDINE 40 MG/1
40 TABLET, FILM COATED ORAL DAILY
Qty: 90 TABLET | Refills: 3 | Status: SHIPPED | OUTPATIENT
Start: 2025-05-09

## 2025-05-09 RX ORDER — NORETHINDRONE 5 MG/1
TABLET ORAL
COMMUNITY
Start: 2025-05-07

## 2025-05-09 NOTE — PROGRESS NOTES
"Chief Complaint   Patient presents with    Abdominal Pain       HPI    Patient seen with the help of the     Nkechi WALKER Jabari Saavedra is a  29 y.o. female here establish care as a new patient for complaints of abdominal pain.    This patient also follow with Dr. Chairez.    Past medical history of polycystic ovarian syndrome, thyroid disease along with fatty liver.    She reports roughly 1 year of generalized abdominal pain that comes and goes described as a stabbing aching sensation that seems to localize to the periumbilical area and \"move around.\"  Reports loud \"intestinal sounds.\"  She has frequent heartburn, occasional nausea without vomiting.  Endorses episodic early satiety.  Her appetite has been good.  Her weight is stable.  BMI 37.53.    Her bowels move every day to every other day.  She is prone to episodes of diarrhea.  She reports occasional bright red blood on the toilet paper and in the toilet bowl.  She has tried fiber with no relief.  She finds probiotics beneficial.    Recent lab work without anemia, normal thyroid function and normal liver function test.    No family history of colon cancer.    Past Medical History:   Diagnosis Date    Abnormal ultrasound of thyroid gland 02/22/2023    Fatty liver     Frequent UTI     History of peritonitis     childhood    Polycystic ovary syndrome     Polyp, corpus uteri     Thyroid disorder     Thyroid nodule 02/22/2023       Past Surgical History:   Procedure Laterality Date    APPENDECTOMY      age 4    D & C HYSTEROSCOPY N/A 01/23/2023    Procedure: DILATATION AND CURETTAGE HYSTEROSCOPY WITH MYOSURE;  Surgeon: Fabricio Barrera MD;  Location: [facility];  Service: Obstetrics/Gynecology;  Laterality: N/A;    DIAGNOSTIC LAPAROSCOPY      for peritonitis in childhood       Scheduled Meds:     Continuous Infusions: No current facility-administered medications for this visit.      PRN Meds:     Allergies   Allergen Reactions "    Provera [Medroxyprogesterone] Other (See Comments)     Suicidal Thoughts./ depression    Metformin GI Intolerance       Social History     Socioeconomic History    Marital status:    Tobacco Use    Smoking status: Never     Passive exposure: Never    Smokeless tobacco: Never   Vaping Use    Vaping status: Never Used   Substance and Sexual Activity    Alcohol use: Not Currently    Drug use: Never    Sexual activity: Yes     Partners: Male     Birth control/protection: None       Family History   Problem Relation Age of Onset    Diabetes Sister     Malig Hyperthermia Neg Hx     Breast cancer Neg Hx     Ovarian cancer Neg Hx     Uterine cancer Neg Hx     Colon cancer Neg Hx        Review of Systems   Gastrointestinal:  Positive for abdominal pain, anal bleeding and nausea.       Vitals:    05/09/25 1348   BP: 107/77   Pulse: 74   Temp: 97.6 °F (36.4 °C)       Physical Exam  Constitutional:       Appearance: She is well-developed.   Abdominal:      General: Bowel sounds are normal. There is no distension.      Palpations: Abdomen is soft. There is no mass.      Tenderness: There is no abdominal tenderness. There is no guarding.      Hernia: No hernia is present.   Skin:     General: Skin is warm and dry.      Capillary Refill: Capillary refill takes less than 2 seconds.   Neurological:      Mental Status: She is alert and oriented to person, place, and time.   Psychiatric:         Behavior: Behavior normal.     Assessment    Diagnoses and all orders for this visit:    1. Generalized abdominal pain (Primary)  -     Case Request; Standing  -     Implement Anesthesia orders day of procedure.; Standing  -     Follow Anesthesia Guidelines / Protocol; Future  -     Verify bowel prep was successful; Standing  -     Give tap water enema if bowel prep was insufficient; Standing  -     Case Request    2. Heartburn  -     Case Request; Standing  -     Implement Anesthesia orders day of procedure.; Standing  -     Follow  Anesthesia Guidelines / Protocol; Future  -     Verify bowel prep was successful; Standing  -     Give tap water enema if bowel prep was insufficient; Standing  -     Case Request    3. Rectal bleeding  -     Case Request; Standing  -     Implement Anesthesia orders day of procedure.; Standing  -     Follow Anesthesia Guidelines / Protocol; Future  -     Verify bowel prep was successful; Standing  -     Give tap water enema if bowel prep was insufficient; Standing  -     Case Request    4. Diarrhea, unspecified type  -     Case Request; Standing  -     Implement Anesthesia orders day of procedure.; Standing  -     Follow Anesthesia Guidelines / Protocol; Future  -     Verify bowel prep was successful; Standing  -     Give tap water enema if bowel prep was insufficient; Standing  -     Case Request    5. Nausea  -     Case Request; Standing  -     Implement Anesthesia orders day of procedure.; Standing  -     Follow Anesthesia Guidelines / Protocol; Future  -     Verify bowel prep was successful; Standing  -     Give tap water enema if bowel prep was insufficient; Standing  -     Case Request    Other orders  -     famotidine (Pepcid) 40 MG tablet; Take 1 tablet by mouth Daily.  Dispense: 90 tablet; Refill: 3    Plan    Recommend EGD and colonoscopy with Dr. Chairez  Risk-benefit of procedure reviewed with the patient  Start Pepcid 40 mg once daily in the meanwhile and monitor for symptom improvement  High-fiber diet and continue probiotics  Await endoscopic findings for further recommendations and follow-up         MIAH Sexton  [facility]  [address] [place] [address]  Office: [phone]

## 2025-05-09 NOTE — TELEPHONE ENCOUNTER
FERNANDO Garcia for COLONOSCOPY/EGD on  8/12/25 arrive at 9:00  . Gave prep instructions to pt in office ....miralax

## 2025-08-11 ENCOUNTER — TELEPHONE (OUTPATIENT)
Dept: GASTROENTEROLOGY | Facility: CLINIC | Age: 30
End: 2025-08-11
Payer: MEDICAID